# Patient Record
Sex: MALE | Race: OTHER | Employment: PART TIME | URBAN - METROPOLITAN AREA
[De-identification: names, ages, dates, MRNs, and addresses within clinical notes are randomized per-mention and may not be internally consistent; named-entity substitution may affect disease eponyms.]

---

## 2021-04-04 ENCOUNTER — HOSPITAL ENCOUNTER (EMERGENCY)
Age: 26
Discharge: HOME | End: 2021-04-04
Payer: COMMERCIAL

## 2021-04-04 VITALS
DIASTOLIC BLOOD PRESSURE: 79 MMHG | WEIGHT: 205 LBS | HEART RATE: 78 BPM | RESPIRATION RATE: 14 BRPM | TEMPERATURE: 97.6 F | BODY MASS INDEX: 31.07 KG/M2 | SYSTOLIC BLOOD PRESSURE: 110 MMHG | HEIGHT: 68 IN | OXYGEN SATURATION: 100 %

## 2021-04-04 DIAGNOSIS — J30.9 ALLERGIC SINUSITIS: Primary | ICD-10-CM

## 2021-04-04 PROCEDURE — 99283 EMERGENCY DEPT VISIT LOW MDM: CPT

## 2021-04-04 RX ORDER — FLUTICASONE PROPIONATE 50 MCG
1 SPRAY, SUSPENSION (ML) NASAL DAILY
Qty: 1 BOTTLE | Refills: 0 | Status: SHIPPED | OUTPATIENT
Start: 2021-04-04 | End: 2021-04-25 | Stop reason: ALTCHOICE

## 2021-04-04 RX ORDER — FEXOFENADINE HCL AND PSEUDOEPHEDRINE HCI 180; 240 MG/1; MG/1
1 TABLET, EXTENDED RELEASE ORAL DAILY
Qty: 30 TABLET | Refills: 1 | Status: SHIPPED | OUTPATIENT
Start: 2021-04-04 | End: 2021-04-25 | Stop reason: ALTCHOICE

## 2021-04-04 RX ORDER — OXYMETAZOLINE HYDROCHLORIDE 0.05 G/100ML
2 SPRAY NASAL 2 TIMES DAILY
Qty: 1 BOTTLE | Refills: 0 | Status: SHIPPED | OUTPATIENT
Start: 2021-04-04 | End: 2021-04-07

## 2021-04-04 NOTE — ED PROVIDER NOTES
[facility]     Department of Emergency Medicine   ED  Provider Note  Admit Date/RoomTime: 4/4/2021 11:50 AM  ED Room: 30-A/30-A    Chief Complaint:   Nasal Congestion (for the past month. pt having nasal congestion. no cough or SOB. )    History of Present Illness      Asaf Parker is a 30 y.o. old male who presents to the ED for nasal congestion and sneezing that has been ongoing for the past month. Patient states his nose gets congested mostly at nighttime and he has to breathe through his mouth which is causing him to have dry mouth. He denies any headache, dizziness, fever/chills, rash, sore throat, ear pain, chest pain, shortness of breath, pain with breathing, abdominal pain, nausea, vomiting, or recent travel. Patient is alert and oriented x3 and in no apparent distress at this exam.  He states he has tried over-the-counter antihistamine with no relief. Patient does not have a family doctor     ROS   Pertinent positives and negatives are stated within HPI, all other systems reviewed and are negative. Past Medical History:  has no past medical history on file. Past Surgical History:  has no past surgical history on file. Social History:      Family History: family history is not on file. The patients home medications have been reviewed. Allergies: Patient has no known allergies. Allergies have been reviewed with patient. Physical Exam   VS:  /79   Pulse 78   Temp 97.6 °F (36.4 °C) (Temporal)   Resp 14   Ht 5' 8\" (1.727 m)   Wt 205 lb (93 kg)   SpO2 100%   BMI 31.17 kg/m²      Oxygen Saturation Interpretation: Normal.    Constitutional:  Alert, development consistent with age. NAD  Eyes: EOMI, non-injected conjunctiva   Ears:  External Ears: Bilateral normal.              TM's & External Canals:  normal TM's and external ear canals both ears. Throat: no erythema or exudates noted. , uvula midline, clear PND, Airway patent     Neck/Lymphatic: Supple. There is no cervical node tenderness. Non-tender with no meningeal signs   Respiratory:  Clear to auscultation and breath sounds equal, good air flow. No respiratory distress  CV: Regular rate and rhythm  Abdomen: Soft, non-tender, non-distended  Integument:  No rashes or erythema present. Neurological:  Motor functions intact. Lab / Imaging Results   (All laboratory and radiology results have been personally reviewed by myself)  Labs:  No results found for this visit on 04/04/21. Imaging: All Radiology results interpreted by Radiologist unless otherwise noted. No orders to display     ED Course / Medical Decision Making   ED Medications:   Medications - No data to display    Consults:  None    Procedures:  none     Medical Decision Making:   Patient is well appearing, non toxic and appropriate for outpatient management. Plan is for symptom management and PCP follow up. Counseling: The emergency provider has spoken with the patient and/or caregiver and discussed todays results, in addition to providing specific details for the plan of care and counseling regarding the diagnosis and prognosis. Questions are answered at this time and they are agreeable with the plan. All results reviewed with pt and all questions answered. I discussed the differential, results and discharge plan with the patient and/or family/friend/caregiver if present. I emphasized the importance of follow-up with the physician I referred them to in the timeframe recommended. I explained reasons for the patient to return to the Emergency Department. Additional verbal discharge instructions were also given and discussed with the patient to supplement those generated by the EMR. We also discussed medications that were prescribed (if any) including common side effects and interactions. All questions were addressed. They understand return precautions and discharge instructions.  The patient and/or family/friend/caregiver expressed understanding. Vitals were stable and they were in no distress at discharge. Patient advised to only use Afrin for 3 days and then start Flonase afterwards if needed   Antibiotics are not indicated at this time based on clinical presentation and physical findings. Not hypoxic, nothing to suggest pneumonia. Patient is well appearing, non toxic and appropriate for outpatient management. Plan of Care: Normal progression of disease discussed. All questions answered. Explained the rationale for symptomatic treatment rather than use of an antibiotic. Instruction provided in the use of fluids, vaporizer, acetaminophen, and other OTC medication for symptom control. Extra fluids  Analgesics as needed, dose reviewed. Follow up as needed should symptoms fail to improve. Assessment     1. Allergic sinusitis      Plan   Discharge to home  Patient condition is good    New Medications     New Prescriptions    FEXOFENADINE-PSEUDOEPHEDRINE (ALLEGRA-D 24HR) 180-240 MG PER EXTENDED RELEASE TABLET    Take 1 tablet by mouth daily    FLUTICASONE (FLONASE) 50 MCG/ACT NASAL SPRAY    1 spray by Each Nostril route daily    OXYMETAZOLINE (12 HOUR NASAL SPRAY) 0.05 % NASAL SPRAY    2 sprays by Nasal route 2 times daily for 3 days       Electronically signed by Oumou Askew PA-C   DD: 4/4/21  **This report was transcribed using voice recognition software. Every effort was made to ensure accuracy; however, inadvertent computerized transcription errors may be present.     END OF ED PROVIDER NOTE          Oumou Askew PA-C  04/04/21 7640

## 2021-04-04 NOTE — ED NOTES
Bed: 30-A  Expected date:   Expected time:   Means of arrival:   Comments:  JUDIE Bolanos.  Francia Libman, KATHLEEN  04/04/21 1208

## 2021-04-25 ENCOUNTER — APPOINTMENT (OUTPATIENT)
Dept: GENERAL RADIOLOGY | Age: 26
End: 2021-04-25
Payer: COMMERCIAL

## 2021-04-25 ENCOUNTER — HOSPITAL ENCOUNTER (EMERGENCY)
Age: 26
Discharge: HOME | End: 2021-04-25
Payer: COMMERCIAL

## 2021-04-25 VITALS
BODY MASS INDEX: 31.07 KG/M2 | OXYGEN SATURATION: 100 % | HEART RATE: 100 BPM | DIASTOLIC BLOOD PRESSURE: 89 MMHG | WEIGHT: 205 LBS | HEIGHT: 68 IN | TEMPERATURE: 98.5 F | SYSTOLIC BLOOD PRESSURE: 128 MMHG | RESPIRATION RATE: 14 BRPM

## 2021-04-25 DIAGNOSIS — Z71.89 EDUCATED ABOUT 2019 NOVEL CORONAVIRUS INFECTION: ICD-10-CM

## 2021-04-25 DIAGNOSIS — R52 GENERALIZED BODY ACHES: ICD-10-CM

## 2021-04-25 DIAGNOSIS — J06.9 VIRAL URI WITH COUGH: Primary | ICD-10-CM

## 2021-04-25 LAB — STREP GRP A PCR: NEGATIVE

## 2021-04-25 PROCEDURE — 87880 STREP A ASSAY W/OPTIC: CPT

## 2021-04-25 PROCEDURE — 71046 X-RAY EXAM CHEST 2 VIEWS: CPT

## 2021-04-25 PROCEDURE — 6370000000 HC RX 637 (ALT 250 FOR IP): Performed by: NURSE PRACTITIONER

## 2021-04-25 PROCEDURE — U0003 INFECTIOUS AGENT DETECTION BY NUCLEIC ACID (DNA OR RNA); SEVERE ACUTE RESPIRATORY SYNDROME CORONAVIRUS 2 (SARS-COV-2) (CORONAVIRUS DISEASE [COVID-19]), AMPLIFIED PROBE TECHNIQUE, MAKING USE OF HIGH THROUGHPUT TECHNOLOGIES AS DESCRIBED BY CMS-2020-01-R: HCPCS

## 2021-04-25 PROCEDURE — 99284 EMERGENCY DEPT VISIT MOD MDM: CPT

## 2021-04-25 RX ORDER — BENZONATATE 100 MG/1
100 CAPSULE ORAL 3 TIMES DAILY PRN
Qty: 21 CAPSULE | Refills: 0 | Status: SHIPPED | OUTPATIENT
Start: 2021-04-25 | End: 2021-05-02

## 2021-04-25 RX ORDER — ACETAMINOPHEN 500 MG
1000 TABLET ORAL ONCE
Status: COMPLETED | OUTPATIENT
Start: 2021-04-25 | End: 2021-04-25

## 2021-04-25 RX ORDER — ACETAMINOPHEN 500 MG
500 TABLET ORAL EVERY 6 HOURS PRN
Qty: 20 TABLET | Refills: 0 | Status: SHIPPED | OUTPATIENT
Start: 2021-04-25 | End: 2021-07-07 | Stop reason: ALTCHOICE

## 2021-04-25 RX ADMIN — ACETAMINOPHEN 1000 MG: 500 TABLET ORAL at 12:59

## 2021-04-25 ASSESSMENT — PAIN SCALES - GENERAL: PAINLEVEL_OUTOF10: 5

## 2021-04-25 NOTE — ED PROVIDER NOTES
[address] [place] [address]  Department of Emergency Medicine   ED  Encounter Note  Admit Date/RoomTime: 2021 12:11 PM  ED Room:       NAME: Dony Turcios  : 1995  MRN: [id number]     Chief Complaint:  Generalized Body Aches (x 2 days) and Cough    History of Present Illness      Dony Turcios is a 30 y.o. old male who presents to the emergency department by private vehicle with spouse with complaints of bilateral sore throat and non productive cough for the past 2 days. He complains of gradual onset of bilateral sore throat pain, which occured 2 day(s) prior to arrival. Associated Signs & Symptoms: generalized body aches. Since onset the symptoms have been persistent and gradually worsening. He is drinking plenty of fluids. He denies any chest pain, shortness of breath, palpitations, dizziness, nausea, vomiting, diarrhea, headache, neck stiffness, rashes, hemoptysis, leg pain, leg swelling or recent travel. Patient works at [employer] and will need an excuse. He denies any vaccinations or history of COVID-19. He denies any excessive fatigue. He denies any tobacco use. Denies any wheezing or history of asthma. He denies any loss of taste or sense of smell. Patient spouse is also here being treated for similar symptoms. Exposed To: Streptococcus: unknown. Infectious Mononucleosis:  unknown. Symptoms:  Pain:  Yes. Muffled Voice:  No.                            Hoarse:  No.                            Difficulty with Secretions:  No.    Centor Score (MODIFIED) For Strep Pharyngitis:    Age 3-14 Years   no (0)     Age >44 Years   NO     Exudate or Swelling on Tonsils   yes (1)     Tender/Swollen Anterior Cervical Nodes   yes (1)     Fever? (T > 38°C, 100.4°F)   no (0)     Absence of Cough   no (0)   TOTAL POINTS   2   Score of Zero = Probability of Strep Pharyngitis: 1% - 2.5%. ,   No further testing nor antibiotics. Score of 1 = Probability of Strep Pharyngitis: 5% - 10%. ,   No further testing nor antibiotics. Score of 2 = Probability of Strep Phar: 11% - 17%. Culture/test all, ATB's only for positive culture results. Score of 3 = Probability of Strep Phar: 28% - 35%. Culture/test all, ATB's only for positive culture results  Score of 4 or 5 = Probability of Strep Pharyngitis: 51% - 53%. Treat empirically with antibiotics. ROS   Pertinent positives and negatives are stated within HPI, all other systems reviewed and are negative. Past Medical History:  has no past medical history on file. Surgical History:  has no past surgical history on file. Social History:  reports that he has never smoked. He has never used smokeless tobacco. He reports previous alcohol use. He reports that he does not use drugs. Family History: family history is not on file. Allergies: Patient has no known allergies. Physical Exam   Oxygen Saturation Interpretation: Normal.        ED Triage Vitals [04/25/21 1208]   BP Temp Temp Source Pulse Resp SpO2 Height Weight   128/89 98.5 °F (36.9 °C) Temporal 100 14 100 % 5' 8\" (1.727 m) 205 lb (93 kg)         Constitutional:  Alert, development consistent with age. .  Ears:  TMs without perforation, injection, or bulging. External canals clear without exudate. Throat: Airway Patent. no exudates noted. Teeth and gums normal.  Moderate posterior pharynx erythema with no tonsillar enlargement. Neck/Lymphatic:  Supple. There is no  preauricular, submental, parotid, posterior cervical, supraclavicular, epitrochlear and axillary node tenderness. Bilateral anterior cervical node tenderness with no enlargement. respiratory:  Clear to auscultation and breath sounds equal.    CV: Regular rate and rhythm, normal heart sounds, without pathological murmurs, ectopy, gallops, or rubs. GI:  Abdomen Soft, nontender, good bowel sounds.   No firm or pulsatile mass.  Inegument:  No rashes, erythema present. Neurological:  Oriented. Motor functions intact. Lab / Imaging Results   (All laboratory and radiology results have been personally reviewed by myself)  Labs:  Results for orders placed or performed during the hospital encounter of 04/25/21   Strep Screen Group A Throat    Specimen: Throat   Result Value Ref Range    Strep Grp A PCR Negative Negative     Imaging: All Radiology results interpreted by Radiologist unless otherwise noted. XR CHEST (2 VW)   Final Result   No acute cardiopulmonary process. ED Course / Medical Decision Making     Medications   acetaminophen (TYLENOL) tablet 1,000 mg (1,000 mg Oral Given 4/25/21 8329)        Consult(s):   None    Procedure(s):   none    MDM:   Based on moderate suspicion for Strep as per history/physical findings, Rapid Strep/Throat Culture testing was done  Pharyngitis is unlikely to  be Strep. Antibiotics are not indicated at this time based on clinical presentation and physical findings. Not hypoxic, nothing to suggest pneumonia. Patient is well appearing, non toxic and appropriate for outpatient management. Patient advised on COVID-19 isolation for the next 8 days. Since he complains of symptoms for the past 2 days. Patient will given a inhaler Tessalon Perles and Tylenol for symptomatic relief. Advised on signs and symptoms warranting immediate return to the ED for reevaluation. Patient is afebrile, nontoxic in appearance, hemodynamically stable has oxygen saturation 100% on room air in no acute respiratory distress or complaints of chest pain or shortness of breath. Chest x-ray is negative for any acute findings. Patient advised on isolation for 10 days after symptoms onset. Work excuse provided. Advised on signs and symptoms warranting immediate return to the ED for reevaluation at any time. Plan of Care: Normal progression of disease discussed. All questions answered.   Explained the rationale for symptomatic treatment rather than use of an antibiotic. Instruction provided in the use of fluids, vaporizer, acetaminophen, and other OTC medication for symptom control. Extra fluids  Analgesics as needed, dose reviewed. Follow up as needed should symptoms fail to improve. Follow-up in 3 days, or sooner should symptoms worsen. Counseling:  I reviewed today's visit with the patient in addition to providing specific details for the plan of care and counseling regarding the diagnosis and prognosis. Questions are answered at this time and are agreeable with the plan. Assessment     1. Viral URI with cough    2. Generalized body aches    3. Educated about 2019 novel coronavirus infection      Plan   Discharge home. Patient condition is good    New Medications     Discharge Medication List as of 4/25/2021  1:53 PM      START taking these medications    Details   benzonatate (TESSALON PERLES) 100 MG capsule Take 1 capsule by mouth 3 times daily as needed for Cough, Disp-21 capsule, R-0Print      acetaminophen (APAP EXTRA STRENGTH) 500 MG tablet Take 1 tablet by mouth every 6 hours as needed for Pain, Disp-20 tablet, R-0Print      Magic Mouthwash (MIRACLE MOUTHWASH) Swish and spit 5 mLs 4 times daily as needed for Irritation Preparation: 10cc maalox,  10cc Benadryl, 10cc Viscous Lidocaine, Disp-240 mL, R-0Print           Electronically signed by JENNA Greenberg CNP   DD: 4/25/21  **This report was transcribed using voice recognition software. Every effort was made to ensure accuracy; however, inadvertent computerized transcription errors may be present.   END OF ED PROVIDER NOTE     JENNA Greenberg CNP  04/25/21 5481

## 2021-04-26 ENCOUNTER — CARE COORDINATION (OUTPATIENT)
Dept: CARE COORDINATION | Age: 26
End: 2021-04-26

## 2021-04-26 LAB — SARS-COV-2, PCR: DETECTED

## 2021-04-26 NOTE — CARE COORDINATION
Patient contacted regarding [facility]AT-29 diagnosis\". Discussed COVID-19 related testing which was available at this time. Test results were positive. Patient informed of results, if available? Yes    LPN Care Coordinator contacted the patient by telephone to perform post discharge assessment. Call within 2 business days of discharge: Yes. Verified name and  with patient as identifiers. Provided introduction to self, and explanation of the CTN/ACM role, and reason for call due to risk factors for infection and/or exposure to COVID-19. Symptoms reviewed with patient who verbalized the following symptoms: fatigue, pain or aching joints and cough. Due to no new or worsening symptoms encounter was not routed to provider for escalation. Discussed follow-up appointments. If no appointment was previously scheduled, appointment scheduling offered: Yes  [facility] follow up appointment(s): No future appointments. Non-[facility] follow up appointment(s): NA    Patient states he is feeling better. Patient was given phone number to Physician Referral line to establish with a new PCP. List of Walk in clinics was emailed to patient as well. Patient states they have filled all prescriptions and are taking them as written. Advised hydration with gatorade or pedialyte. Tylenol for fever, aches. Patient also advised when to return to ER, significant worsening of symptoms, especially shortness of breath or elevated temperatures. Patient voiced understanding. Will be followed by [facility]. Non-face-to-face services provided:  Obtained and reviewed discharge summary and/or continuity of care documents  Education of patient/family/caregiver/guardian to support self-management-symptom management. Advance Care Planning:   Does patient have an Advance Directive:  decision maker updated. Patient has following risk factors of: no known risk factors.  LPN CC reviewed discharge instructions, medical action plan and red flags such as increased shortness of breath, increasing fever and signs of decompensation with patient who verbalized understanding. Discussed exposure protocols and quarantine with CDC Guidelines What to do if you are sick with coronavirus disease 2019.  Patient was given an opportunity for questions and concerns. The patient agrees to contact the [facility] exposure line [phone], [facility]: ([phone]) and PCP office for questions related to their healthcare. LPN CC provided contact information for future needs. Reviewed and educated patient on any new and changed medications related to discharge diagnosis     Was patient discharged with a pulse oximeter? No.     Patient/family/caregiver given information for GetWell Loop and agrees to enroll yes  Patient's preferred e-mail:  [email or web address]  Patient's preferred phone number: [phone]  Based on Loop alert triggers, patient will be contacted by nurse care manager for worsening symptoms. Pt will be further monitored by COVID Loop Team based on severity of symptoms and risk factors.

## 2021-07-07 ENCOUNTER — OFFICE VISIT (OUTPATIENT)
Dept: FAMILY MEDICINE CLINIC | Age: 26
End: 2021-07-07
Payer: COMMERCIAL

## 2021-07-07 VITALS
TEMPERATURE: 97.3 F | WEIGHT: 204 LBS | DIASTOLIC BLOOD PRESSURE: 82 MMHG | HEART RATE: 87 BPM | BODY MASS INDEX: 30.92 KG/M2 | HEIGHT: 68 IN | SYSTOLIC BLOOD PRESSURE: 120 MMHG | OXYGEN SATURATION: 98 % | RESPIRATION RATE: 18 BRPM

## 2021-07-07 DIAGNOSIS — Z00.00 GENERAL MEDICAL EXAM: ICD-10-CM

## 2021-07-07 DIAGNOSIS — Z00.00 GENERAL MEDICAL EXAM: Primary | ICD-10-CM

## 2021-07-07 LAB
ALBUMIN SERPL-MCNC: 4.2 G/DL (ref 3.5–5.2)
ALP BLD-CCNC: 74 U/L (ref 40–129)
ALT SERPL-CCNC: 17 U/L (ref 0–40)
ANION GAP SERPL CALCULATED.3IONS-SCNC: 15 MMOL/L (ref 7–16)
AST SERPL-CCNC: 23 U/L (ref 0–39)
BASOPHILS ABSOLUTE: 0.05 E9/L (ref 0–0.2)
BASOPHILS RELATIVE PERCENT: 0.7 % (ref 0–2)
BILIRUB SERPL-MCNC: 1 MG/DL (ref 0–1.2)
BUN BLDV-MCNC: 12 MG/DL (ref 6–20)
CALCIUM SERPL-MCNC: 9.3 MG/DL (ref 8.6–10.2)
CHLORIDE BLD-SCNC: 103 MMOL/L (ref 98–107)
CHOLESTEROL, TOTAL: 187 MG/DL (ref 0–199)
CO2: 23 MMOL/L (ref 22–29)
CREAT SERPL-MCNC: 0.9 MG/DL (ref 0.7–1.2)
EOSINOPHILS ABSOLUTE: 0.2 E9/L (ref 0.05–0.5)
EOSINOPHILS RELATIVE PERCENT: 3 % (ref 0–6)
GFR AFRICAN AMERICAN: >60
GFR NON-AFRICAN AMERICAN: >60 ML/MIN/1.73
GLUCOSE BLD-MCNC: 90 MG/DL (ref 74–99)
HCT VFR BLD CALC: 43.9 % (ref 37–54)
HDLC SERPL-MCNC: 35 MG/DL
HEMOGLOBIN: 13.7 G/DL (ref 12.5–16.5)
IMMATURE GRANULOCYTES #: 0.02 E9/L
IMMATURE GRANULOCYTES %: 0.3 % (ref 0–5)
LDL CHOLESTEROL CALCULATED: 123 MG/DL (ref 0–99)
LIPASE: 31 U/L (ref 13–60)
LYMPHOCYTES ABSOLUTE: 2.71 E9/L (ref 1.5–4)
LYMPHOCYTES RELATIVE PERCENT: 40.3 % (ref 20–42)
MCH RBC QN AUTO: 26.2 PG (ref 26–35)
MCHC RBC AUTO-ENTMCNC: 31.2 % (ref 32–34.5)
MCV RBC AUTO: 84.1 FL (ref 80–99.9)
MONOCYTES ABSOLUTE: 0.66 E9/L (ref 0.1–0.95)
MONOCYTES RELATIVE PERCENT: 9.8 % (ref 2–12)
NEUTROPHILS ABSOLUTE: 3.09 E9/L (ref 1.8–7.3)
NEUTROPHILS RELATIVE PERCENT: 45.9 % (ref 43–80)
PDW BLD-RTO: 14 FL (ref 11.5–15)
PLATELET # BLD: 205 E9/L (ref 130–450)
PMV BLD AUTO: 12.8 FL (ref 7–12)
POTASSIUM SERPL-SCNC: 4.7 MMOL/L (ref 3.5–5)
RBC # BLD: 5.22 E12/L (ref 3.8–5.8)
SODIUM BLD-SCNC: 141 MMOL/L (ref 132–146)
TOTAL PROTEIN: 6.8 G/DL (ref 6.4–8.3)
TRIGL SERPL-MCNC: 145 MG/DL (ref 0–149)
TSH SERPL DL<=0.05 MIU/L-ACNC: 1.28 UIU/ML (ref 0.27–4.2)
VLDLC SERPL CALC-MCNC: 29 MG/DL
WBC # BLD: 6.7 E9/L (ref 4.5–11.5)

## 2021-07-07 PROCEDURE — 99203 OFFICE O/P NEW LOW 30 MIN: CPT | Performed by: PHYSICIAN ASSISTANT

## 2021-07-07 NOTE — PROGRESS NOTES
21  Bree Parra : 1995 Sex: male  Age 30 y.o. Subjective:  Chief Complaint   Patient presents with    Annual Exam     no complaints          HPI:   Bree Parra , 30 y.o. male presents to [facility] for evaluation of general medical exam.  The patient states that he has no complaints he just has not been missing her by evaluated by anyone and was wondering if he could get some blood work done. The patient does not have a PCP. The patient is not a smoker, no illicit drug use, no alcohol use. He has not had any previous surgeries. He is never been prescribed any medications. ROS:   Unless otherwise stated in this report the patient's positive and negative responses for review of systems for constitutional, eyes, ENT, cardiovascular, respiratory, gastrointestinal, neurological, , musculoskeletal, and integument systems and related systems to the presenting problem are either stated in the history of present illness or were not pertinent or were negative for the symptoms and/or complaints related to the presenting medical problem. Positives and pertinent negatives as per HPI. All others reviewed and are negative. PMH:   History reviewed. No pertinent past medical history. History reviewed. No pertinent surgical history. History reviewed. No pertinent family history. Medications:   No current outpatient medications on file. Allergies:   No Known Allergies    Social History:     Social History     Tobacco Use    Smoking status: Never Smoker    Smokeless tobacco: Never Used   Substance Use Topics    Alcohol use: Not Currently    Drug use: Never       Patient lives at home. Physical Exam:     Vitals:    21 1218   BP: 120/82   Pulse: 87   Resp: 18   Temp: 97.3 °F (36.3 °C)   SpO2: 98%   Weight: 204 lb (92.5 kg)   Height: 5' 8\" (1.727 m)       Exam:  Physical Exam  Nurses note and vital signs reviewed and patient is not hypoxic.     General: The patient appears well and in no apparent distress. Patient is resting comfortably on cart. Skin: Warm, dry, no pallor noted. There is no rash noted. Head: Normocephalic, atraumatic. Eye: Normal conjunctiva  Ears, Nose, Mouth, and Throat: Oral mucosa is moist  Cardiovascular: Regular Rate and Rhythm  Respiratory: Patient is in no distress, no accessory muscle use, lungs are clear to auscultation, no wheezing, crackles or rhonchi  Back: Non-tender, no CVA tenderness bilaterally to percussion. GI: Normal bowel sounds, no tenderness to palpation, no masses appreciated. No rebound, guarding, or rigidity noted.   Musculoskeletal: The patient has no evidence of calf tenderness, no pitting edema, symmetrical pulses noted bilaterally  Neurological: A&O x4, normal speech        Testing:     Component      Latest Ref Rng & Units 7/7/2021 7/7/2021 7/7/2021 7/7/2021           1:00 PM  1:00 PM  1:00 PM  1:00 PM   WBC      4.5 - 11.5 E9/L       RBC      3.80 - 5.80 E12/L       Hemoglobin Quant      12.5 - 16.5 g/dL       Hematocrit      37.0 - 54.0 %       MCV      80.0 - 99.9 fL       MCH      26.0 - 35.0 pg       MCHC      32.0 - 34.5 %       RDW      11.5 - 15.0 fL       Platelet Count      334 - 450 E9/L       MPV      7.0 - 12.0 fL       Neutrophils %      43.0 - 80.0 %       Immature Granulocytes %      0.0 - 5.0 %       Lymphocyte %      20.0 - 42.0 %       Monocytes %      2.0 - 12.0 %       Eosinophils %      0.0 - 6.0 %       Basophils %      0.0 - 2.0 %       Neutrophils Absolute      1.80 - 7.30 E9/L       Immature Granulocytes #      E9/L       Lymphocytes Absolute      1.50 - 4.00 E9/L       Monocytes Absolute      0.10 - 0.95 E9/L       Eosinophils Absolute      0.05 - 0.50 E9/L       Basophils Absolute      0.00 - 0.20 E9/L       Sodium      132 - 146 mmol/L 141      Potassium      3.5 - 5.0 mmol/L 4.7      Chloride      98 - 107 mmol/L 103      CO2      22 - 29 mmol/L 23      Anion Gap      7 - 16 mmol/L 15 Glucose      74 - 99 mg/dL 90      BUN      6 - 20 mg/dL 12      Creatinine      0.7 - 1.2 mg/dL 0.9      GFR Non-African American      >=60 mL/min/1.73 >60      GFR        >60      Calcium      8.6 - 10.2 mg/dL 9.3      Total Protein      6.4 - 8.3 g/dL 6.8      Albumin      3.5 - 5.2 g/dL 4.2      Bilirubin      0.0 - 1.2 mg/dL 1.0      Alk Phos      40 - 129 U/L 74      ALT      0 - 40 U/L 17      AST      0 - 39 U/L 23      Cholesterol, Total      0 - 199 mg/dL   187    Triglycerides      0 - 149 mg/dL   145    HDL Cholesterol      >40 mg/dL   35    LDL Calculated      0 - 99 mg/dL   123 (H)    VLDL Cholesterol Calculated      mg/dL   29    TSH      0.270 - 4.200 uIU/mL    1.280   Lipase      13 - 60 U/L  31       Component      Latest Ref Rng & Units 7/7/2021           1:00 PM   WBC      4.5 - 11.5 E9/L 6.7   RBC      3.80 - 5.80 E12/L 5.22   Hemoglobin Quant      12.5 - 16.5 g/dL 13.7   Hematocrit      37.0 - 54.0 % 43.9   MCV      80.0 - 99.9 fL 84.1   MCH      26.0 - 35.0 pg 26.2   MCHC      32.0 - 34.5 % 31.2 (L)   RDW      11.5 - 15.0 fL 14.0   Platelet Count      430 - 450 E9/L 205   MPV      7.0 - 12.0 fL 12.8 (H)   Neutrophils %      43.0 - 80.0 % 45.9   Immature Granulocytes %      0.0 - 5.0 % 0.3   Lymphocyte %      20.0 - 42.0 % 40.3   Monocytes %      2.0 - 12.0 % 9.8   Eosinophils %      0.0 - 6.0 % 3.0   Basophils %      0.0 - 2.0 % 0.7   Neutrophils Absolute      1.80 - 7.30 E9/L 3.09   Immature Granulocytes #      E9/L 0.02   Lymphocytes Absolute      1.50 - 4.00 E9/L 2.71   Monocytes Absolute      0.10 - 0.95 E9/L 0.66   Eosinophils Absolute      0.05 - 0.50 E9/L 0.20   Basophils Absolute      0.00 - 0.20 E9/L 0.05   Sodium      132 - 146 mmol/L    Potassium      3.5 - 5.0 mmol/L    Chloride      98 - 107 mmol/L    CO2      22 - 29 mmol/L    Anion Gap      7 - 16 mmol/L    Glucose      74 - 99 mg/dL    BUN      6 - 20 mg/dL    Creatinine      0.7 - 1.2 mg/dL    GFR Non-African American      >=60 mL/min/1.73    GFR African American          Calcium      8.6 - 10.2 mg/dL    Total Protein      6.4 - 8.3 g/dL    Albumin      3.5 - 5.2 g/dL    Bilirubin      0.0 - 1.2 mg/dL    Alk Phos      40 - 129 U/L    ALT      0 - 40 U/L    AST      0 - 39 U/L    Cholesterol, Total      0 - 199 mg/dL    Triglycerides      0 - 149 mg/dL    HDL Cholesterol      >40 mg/dL    LDL Calculated      0 - 99 mg/dL    VLDL Cholesterol Calculated      mg/dL    TSH      0.270 - 4.200 uIU/mL    Lipase      13 - 60 U/L          Medical Decision Making:     Vital signs reviewed    Past medical history reviewed. Allergies reviewed. Medications reviewed. Patient on arrival does not appear to be in any apparent distress or discomfort. The patient has been seen and evaluated. The patient does not appear to be toxic or lethargic. The patient had laboratory studies obtained and showed a normal white blood cell count at 6.7. Hemoglobin hematocrit are stable. Platelet count is 488. No significant left shift. The patient had a comprehensive metabolic panel that was within normal limits. The patient's lipid panel did show a slight elevation of LDL at 123 but total cholesterol was 187. The patient had a HDL of 35. The patient's TSH was normal at 1.280. Lipase was normal at 31. We will update the patient with the plan of care and we did establish him with a PCP on 7/16/2021. The patient is to return to express care or go directly to the emergency department should any of the signs or symptoms worsen. The patient is to followup with primary care physician in 2-3 days for repeat evaluation. The patient has no other questions or concerns at this time the patient will be discharged home. Clinical Impression:   Jazzmine Haile was seen today for annual exam.    Diagnoses and all orders for this visit:    General medical exam  -     CBC Auto Differential; Future  -     COMPREHENSIVE METABOLIC PANEL;  Future  - LIPASE; Future  -     LIPID PANEL; Future  -     TSH; Future        The patient is to call for any concerns or return if any of the signs or symptoms worsen. The patient is to follow-up with PCP in the next 2-3 days for repeat evaluation repeat assessment or go directly to the emergency department.      SIGNATURE: Natasha Cerna III, PA-C

## 2021-07-16 ENCOUNTER — OFFICE VISIT (OUTPATIENT)
Dept: PRIMARY CARE CLINIC | Age: 26
End: 2021-07-16
Payer: COMMERCIAL

## 2021-07-16 VITALS
TEMPERATURE: 97.9 F | HEART RATE: 75 BPM | SYSTOLIC BLOOD PRESSURE: 128 MMHG | BODY MASS INDEX: 30.92 KG/M2 | WEIGHT: 204 LBS | DIASTOLIC BLOOD PRESSURE: 68 MMHG | OXYGEN SATURATION: 98 % | HEIGHT: 68 IN

## 2021-07-16 DIAGNOSIS — G47.19 EXCESSIVE DAYTIME SLEEPINESS: ICD-10-CM

## 2021-07-16 DIAGNOSIS — R53.82 CHRONIC FATIGUE: ICD-10-CM

## 2021-07-16 DIAGNOSIS — R00.2 PALPITATIONS: ICD-10-CM

## 2021-07-16 DIAGNOSIS — Z00.00 ADULT GENERAL MEDICAL EXAM: Primary | ICD-10-CM

## 2021-07-16 PROCEDURE — 93010 ELECTROCARDIOGRAM REPORT: CPT | Performed by: FAMILY MEDICINE

## 2021-07-16 PROCEDURE — 93000 ELECTROCARDIOGRAM COMPLETE: CPT | Performed by: FAMILY MEDICINE

## 2021-07-16 PROCEDURE — 99213 OFFICE O/P EST LOW 20 MIN: CPT | Performed by: FAMILY MEDICINE

## 2021-07-16 PROCEDURE — 96372 THER/PROPH/DIAG INJ SC/IM: CPT | Performed by: FAMILY MEDICINE

## 2021-07-16 RX ORDER — CYANOCOBALAMIN 1000 UG/ML
1000 INJECTION INTRAMUSCULAR; SUBCUTANEOUS ONCE
Status: COMPLETED | OUTPATIENT
Start: 2021-07-16 | End: 2021-07-16

## 2021-07-16 RX ADMIN — CYANOCOBALAMIN 1000 MCG: 1000 INJECTION INTRAMUSCULAR; SUBCUTANEOUS at 17:11

## 2021-07-16 ASSESSMENT — PATIENT HEALTH QUESTIONNAIRE - PHQ9
SUM OF ALL RESPONSES TO PHQ QUESTIONS 1-9: 0
SUM OF ALL RESPONSES TO PHQ QUESTIONS 1-9: 0
1. LITTLE INTEREST OR PLEASURE IN DOING THINGS: 0
2. FEELING DOWN, DEPRESSED OR HOPELESS: 0
SUM OF ALL RESPONSES TO PHQ9 QUESTIONS 1 & 2: 0
SUM OF ALL RESPONSES TO PHQ QUESTIONS 1-9: 0

## 2021-07-16 NOTE — PROGRESS NOTES
bruise/bleed easily. Psychiatric/Behavioral: Positive for sleep disturbance. Negative for hallucinations and suicidal ideas. All other systems reviewed and are negative. Objective   Physical Exam  Vitals reviewed. HENT:      Head: Normocephalic and atraumatic. Eyes:      General: No scleral icterus. Conjunctiva/sclera: Conjunctivae normal.      Pupils: Pupils are equal, round, and reactive to light. Neck:      Thyroid: No thyromegaly. Cardiovascular:      Rate and Rhythm: Normal rate and regular rhythm. Heart sounds: Normal heart sounds. No murmur heard. Pulmonary:      Effort: Pulmonary effort is normal.      Breath sounds: Normal breath sounds. No rales. Abdominal:      General: Bowel sounds are normal. There is no distension. Palpations: Abdomen is soft. Tenderness: There is no abdominal tenderness. Musculoskeletal:         General: Normal range of motion. Cervical back: Neck supple. Lymphadenopathy:      Cervical: No cervical adenopathy. Skin:     General: Skin is warm and dry. Findings: No erythema or rash. Neurological:      Mental Status: He is alert and oriented to person, place, and time. Cranial Nerves: No cranial nerve deficit. Psychiatric:         Judgment: Judgment normal.              EKG Interpretation    Rhythm: normal sinus   Rate: normal  Axis: normal  Ectopy: none  Conduction: normal  ST Segments: nonspecific changes  T Waves: non specific changes and flattening in  III and aVf  Q Waves: none    Clinical Impression: no acute changes and non-specific EKG    Ronal Ragsdale DO      An electronic signature was used to authenticate this note.     --Maxx Ragsdale DO

## 2021-07-19 PROBLEM — F43.29 ADJUSTMENT DISORDER WITH MIXED EMOTIONAL FEATURES: Status: ACTIVE | Noted: 2021-07-19

## 2021-07-19 ASSESSMENT — ENCOUNTER SYMPTOMS
COUGH: 0
PHOTOPHOBIA: 0
BLOOD IN STOOL: 0
CONSTIPATION: 0
SHORTNESS OF BREATH: 0
DIARRHEA: 0
SORE THROAT: 0
BACK PAIN: 0
ABDOMINAL PAIN: 0
VOMITING: 0
NAUSEA: 0

## 2021-08-10 ENCOUNTER — OFFICE VISIT (OUTPATIENT)
Dept: FAMILY MEDICINE CLINIC | Age: 26
End: 2021-08-10

## 2021-08-10 VITALS
OXYGEN SATURATION: 98 % | DIASTOLIC BLOOD PRESSURE: 78 MMHG | HEIGHT: 68 IN | TEMPERATURE: 98 F | WEIGHT: 204 LBS | BODY MASS INDEX: 30.92 KG/M2 | HEART RATE: 99 BPM | SYSTOLIC BLOOD PRESSURE: 140 MMHG

## 2021-08-10 DIAGNOSIS — T63.481A INSECT STINGS, ACCIDENTAL OR UNINTENTIONAL, INITIAL ENCOUNTER: Primary | ICD-10-CM

## 2021-08-10 PROCEDURE — 96372 THER/PROPH/DIAG INJ SC/IM: CPT | Performed by: PHYSICIAN ASSISTANT

## 2021-08-10 PROCEDURE — 99214 OFFICE O/P EST MOD 30 MIN: CPT | Performed by: PHYSICIAN ASSISTANT

## 2021-08-10 RX ORDER — METHYLPREDNISOLONE ACETATE 80 MG/ML
60 INJECTION, SUSPENSION INTRA-ARTICULAR; INTRALESIONAL; INTRAMUSCULAR; SOFT TISSUE ONCE
Status: COMPLETED | OUTPATIENT
Start: 2021-08-10 | End: 2021-08-10

## 2021-08-10 RX ORDER — PREDNISONE 20 MG/1
TABLET ORAL
Qty: 18 TABLET | Refills: 0 | Status: SHIPPED
Start: 2021-08-10 | End: 2021-12-07 | Stop reason: ALTCHOICE

## 2021-08-10 RX ORDER — DIPHENHYDRAMINE HCL 25 MG
25 CAPSULE ORAL EVERY 6 HOURS PRN
Qty: 30 CAPSULE | Refills: 0 | Status: SHIPPED | OUTPATIENT
Start: 2021-08-10 | End: 2021-08-20

## 2021-08-10 RX ORDER — DIPHENHYDRAMINE HYDROCHLORIDE 50 MG/ML
25 INJECTION INTRAMUSCULAR; INTRAVENOUS ONCE
Status: COMPLETED | OUTPATIENT
Start: 2021-08-10 | End: 2021-08-10

## 2021-08-10 RX ADMIN — DIPHENHYDRAMINE HYDROCHLORIDE 25 MG: 50 INJECTION INTRAMUSCULAR; INTRAVENOUS at 15:58

## 2021-08-10 RX ADMIN — METHYLPREDNISOLONE ACETATE 60 MG: 80 INJECTION, SUSPENSION INTRA-ARTICULAR; INTRALESIONAL; INTRAMUSCULAR; SOFT TISSUE at 16:00

## 2021-08-10 NOTE — PROGRESS NOTES
25 MG capsule, Take 1 capsule by mouth every 6 hours as needed for Itching or Allergies, Disp: 30 capsule, Rfl: 0    Allergies:   No Known Allergies    Social History:     Social History     Tobacco Use    Smoking status: Never Smoker    Smokeless tobacco: Never Used   Substance Use Topics    Alcohol use: Not Currently    Drug use: Never       Patient lives at home. Physical Exam:     Vitals:    08/10/21 1538   BP: (!) 140/78   Pulse: 99   Temp: 98 °F (36.7 °C)   SpO2: 98%   Weight: 204 lb (92.5 kg)   Height: 5' 8\" (1.727 m)       Exam:  Physical Exam  Nurses note and vital signs reviewed and patient is not hypoxic. General: The patient appears well and in no apparent distress. Patient is resting comfortably on cart. Skin: Warm, dry, no pallor noted. There is no rash noted. Head: Normocephalic, atraumatic. Eye: Normal conjunctiva  Ears, Nose, Mouth, and Throat: Oral mucosa is moist without evidence of lip or tongue swelling, airway patent  Cardiovascular: Regular Rate and Rhythm  Respiratory: Patient is in no distress, no accessory muscle use, lungs are clear to auscultation, no wheezing, crackles or rhonchi  Musculoskeletal: The patient has 2 areas noted that appear to be evidence of the insect bite. The patient has a medial and lateral area. There is diffuse swelling noted around this area with some mild warmth and erythema. There is no lymphangitic streaking. The patient's pulses were intact the DP/PT 2+. The patient normal capillary refill. No cyanosis or mottling. Compartments are soft. Neurological: A&O x4, normal speech      Testing:           Medical Decision Making:     Vital signs reviewed    Past medical history reviewed. Allergies reviewed. Medications reviewed. Patient on arrival does not appear to be in any apparent distress or discomfort. The patient has been seen and evaluated. The patient does not appear to be toxic or lethargic.      The patient does not appear to be toxic or lethargic. The patient does appear well. Vital signs reviewed and noted to be within normal limits. The patient will be treated with Benadryl and methylprednisone here. The patient will be given Benadryl for home. The patient will also be given a tapering dose of prednisone. The patient is to use ice to the affected area and elevate. The patient is to monitor signs symptoms. If the signs or symptoms change or worsen go directly to the emergency department or return return immediately for evaluation    The patient is to return to express care or go directly to the emergency department should any of the signs or symptoms worsen. The patient is to followup with primary care physician in 2-3 days for repeat evaluation. The patient has no other questions or concerns at this time the patient will be discharged home. Clinical Impression:   Little Glenna was seen today for insect bite and foot swelling. Diagnoses and all orders for this visit:    Insect stings, accidental or unintentional, initial encounter    Other orders  -     diphenhydrAMINE (BENADRYL) injection 25 mg  -     predniSONE (DELTASONE) 20 MG tablet; 3 tablets once daily for 3 days, 2 tablets once daily for 3 days, one tablet once daily for 3 days  -     methylPREDNISolone acetate (DEPO-MEDROL) injection 60 mg  -     diphenhydrAMINE (BENADRYL ALLERGY) 25 MG capsule; Take 1 capsule by mouth every 6 hours as needed for Itching or Allergies        The patient is to call for any concerns or return if any of the signs or symptoms worsen. The patient is to follow-up with PCP in the next 2-3 days for repeat evaluation repeat assessment or go directly to the emergency department.      SIGNATURE: May Fuentes III, PA-C

## 2021-12-07 ENCOUNTER — OFFICE VISIT (OUTPATIENT)
Dept: FAMILY MEDICINE CLINIC | Age: 26
End: 2021-12-07
Payer: COMMERCIAL

## 2021-12-07 VITALS
BODY MASS INDEX: 31.52 KG/M2 | RESPIRATION RATE: 18 BRPM | OXYGEN SATURATION: 98 % | TEMPERATURE: 98.5 F | DIASTOLIC BLOOD PRESSURE: 78 MMHG | HEART RATE: 100 BPM | WEIGHT: 208 LBS | HEIGHT: 68 IN | SYSTOLIC BLOOD PRESSURE: 120 MMHG

## 2021-12-07 DIAGNOSIS — J01.90 ACUTE NON-RECURRENT SINUSITIS, UNSPECIFIED LOCATION: ICD-10-CM

## 2021-12-07 DIAGNOSIS — R50.9 FEVER, UNSPECIFIED FEVER CAUSE: ICD-10-CM

## 2021-12-07 DIAGNOSIS — R09.81 NASAL CONGESTION: ICD-10-CM

## 2021-12-07 DIAGNOSIS — J06.9 ACUTE UPPER RESPIRATORY INFECTION, UNSPECIFIED: Primary | ICD-10-CM

## 2021-12-07 LAB
INFLUENZA A ANTIGEN, POC: NORMAL
INFLUENZA B ANTIGEN, POC: NORMAL
Lab: NORMAL
QC PASS/FAIL: NORMAL
SARS-COV-2 RDRP RESP QL NAA+PROBE: NEGATIVE

## 2021-12-07 PROCEDURE — 87804 INFLUENZA ASSAY W/OPTIC: CPT | Performed by: PHYSICIAN ASSISTANT

## 2021-12-07 PROCEDURE — 87635 SARS-COV-2 COVID-19 AMP PRB: CPT | Performed by: PHYSICIAN ASSISTANT

## 2021-12-07 PROCEDURE — 99213 OFFICE O/P EST LOW 20 MIN: CPT | Performed by: PHYSICIAN ASSISTANT

## 2021-12-07 RX ORDER — BROMPHENIRAMINE MALEATE, PSEUDOEPHEDRINE HYDROCHLORIDE, AND DEXTROMETHORPHAN HYDROBROMIDE 2; 30; 10 MG/5ML; MG/5ML; MG/5ML
5 SYRUP ORAL 4 TIMES DAILY PRN
Qty: 120 ML | Refills: 0 | Status: SHIPPED
Start: 2021-12-07 | End: 2022-01-05

## 2021-12-07 RX ORDER — PREDNISONE 10 MG/1
TABLET ORAL
Qty: 18 TABLET | Refills: 0 | Status: SHIPPED
Start: 2021-12-07 | End: 2022-01-05

## 2021-12-07 RX ORDER — DOXYCYCLINE HYCLATE 100 MG
100 TABLET ORAL 2 TIMES DAILY
Qty: 20 TABLET | Refills: 0 | Status: SHIPPED | OUTPATIENT
Start: 2021-12-07 | End: 2021-12-17

## 2021-12-07 NOTE — LETTER
[facility]  [address] [place] [address]  Phone: [phone]  Fax: [phone] [facility], [address]        December 7, 2021     Patient: Gely Steve   YOB: 1995   Date of Visit: 12/7/2021       To Whom it May Concern:    Gely Steve was seen in my clinic on 12/7/2021. If you have any questions or concerns, please don't hesitate to call.     Sincerely,         ARIEL Guajardo III

## 2021-12-07 NOTE — PROGRESS NOTES
21  Juan A Waggoner : 1995 Sex: male  Age 27 y.o. Subjective:  Chief Complaint   Patient presents with    Cough    Pharyngitis    Fever    Chills         HPI:   Juan A Waggoner , 27 y.o. male presents to [facility] care for evaluation of cough, sore throat, fever, chills    HPI  22-year-old male presents to [facility] for evaluation of cough, congestion, drainage, sore throat. The patient said the symptoms over the last couple of days. The patient has noted some chills. The patient has noted fever. The patient states that it does not seem to be improving. Patient is not currently on any antibiotics. Did have Covid back in 2021. The patient has not had the vaccine. ROS:   Unless otherwise stated in this report the patient's positive and negative responses for review of systems for constitutional, eyes, ENT, cardiovascular, respiratory, gastrointestinal, neurological, , musculoskeletal, and integument systems and related systems to the presenting problem are either stated in the history of present illness or were not pertinent or were negative for the symptoms and/or complaints related to the presenting medical problem. Positives and pertinent negatives as per HPI. All others reviewed and are negative. PMH:   History reviewed. No pertinent past medical history. History reviewed. No pertinent surgical history. History reviewed. No pertinent family history.     Medications:     Current Outpatient Medications:     doxycycline hyclate (VIBRA-TABS) 100 MG tablet, Take 1 tablet by mouth 2 times daily for 10 days, Disp: 20 tablet, Rfl: 0    predniSONE (DELTASONE) 10 MG tablet, 3 tabs once daily for 3 days, 2 tabs once daily for 3 days, 1 tab once daily for 3 days, Disp: 18 tablet, Rfl: 0    brompheniramine-pseudoephedrine-DM 2-30-10 MG/5ML syrup, Take 5 mLs by mouth 4 times daily as needed for Congestion or Cough, Disp: 120 mL, Rfl: 0    Allergies:   No Known Allergies    Social History:     Social History     Tobacco Use    Smoking status: Never Smoker    Smokeless tobacco: Never Used   Substance Use Topics    Alcohol use: Not Currently    Drug use: Never       Patient lives at home. Physical Exam:     Vitals:    12/07/21 1020   BP: 120/78   Site: Right Upper Arm   Position: Sitting   Cuff Size: Medium Adult   Pulse: 100   Resp: 18   Temp: 98.5 °F (36.9 °C)   TempSrc: Temporal   SpO2: 98%   Weight: 208 lb (94.3 kg)   Height: 5' 8\" (1.727 m)       Exam:  Physical Exam  Nurse's notes and vital signs reviewed. The patient is not hypoxic. ? General: Alert, no acute distress, patient resting comfortably Patient is not toxic or lethargic. Skin: Warm, intact, no pallor noted. There is no evidence of rash at this time. Head: Normocephalic, atraumatic  Eye: Normal conjunctiva  Ears, Nose, Throat: Right tympanic membrane clear, left tympanic membrane clear. No drainage or discharge noted. No pre- or post-auricular tenderness, erythema, or swelling noted. Nasal congestion, rhinorrhea  Posterior oropharynx shows no erythema, tonsillar hypertrophy, or exudate. the uvula is midline. No trismus or drooling is noted. Moist mucous membranes. Neck: No anterior/posterior lymphadenopathy noted. No erythema, no masses, no fluctuance or induration noted. No meningeal signs. Cardiovascular: Regular Rate and Rhythm  Respiratory: No acute distress, harsh cough but no rhonchi, wheezing or crackles noted. No stridor or retractions are noted.   Neurological: A&O x4, normal speech  Psychiatric: Cooperative         Testing:     Results for orders placed or performed in visit on 12/07/21   POCT COVID-19, Rapid   Result Value Ref Range    SARS-COV-2, RdRp gene Negative Negative    Lot Number [id number]     QC Pass/Fail Pass    POCT Influenza A/B Antigen   Result Value Ref Range    Inflenza A Ag Neg     Influenza B Ag Neg            Medical Decision Making:     Vital signs reviewed    Past medical history reviewed. Allergies reviewed. Medications reviewed. Patient on arrival does not appear to be in any apparent distress or discomfort. The patient has been seen and evaluated. The patient does not appear to be toxic or lethargic. The patient's rapid Covid was the R DRP gene and that was negative. Influenza was negative. The patient will be treated with Bromfed, doxycycline, prednisone. The patient was educated on the proper dosage of motrin and tylenol and the appropriate intervals of each. The patient is to increase fluid intake over the next several days. The patient is to use OTC decongestant as needed. The patient is to return to express care or go directly to the emergency department should any of the signs or symptoms worsen. The patient is to followup with primary care physician in 2-3 days for repeat evaluation. The patient has no other questions or concerns at this time the patient will be discharged home. Clinical Impression:   Jose Soria was seen today for cough, pharyngitis, fever and chills. Diagnoses and all orders for this visit:    Acute upper respiratory infection, unspecified    Fever, unspecified fever cause  -     POCT COVID-19, Rapid  -     POCT Influenza A/B Antigen    Acute non-recurrent sinusitis, unspecified location    Nasal congestion    Other orders  -     doxycycline hyclate (VIBRA-TABS) 100 MG tablet; Take 1 tablet by mouth 2 times daily for 10 days  -     predniSONE (DELTASONE) 10 MG tablet; 3 tabs once daily for 3 days, 2 tabs once daily for 3 days, 1 tab once daily for 3 days  -     brompheniramine-pseudoephedrine-DM 2-30-10 MG/5ML syrup; Take 5 mLs by mouth 4 times daily as needed for Congestion or Cough        The patient is to call for any concerns or return if any of the signs or symptoms worsen. The patient is to follow-up with PCP in the next 2-3 days for repeat evaluation repeat assessment or go directly to the emergency department.

## 2021-12-28 ENCOUNTER — OFFICE VISIT (OUTPATIENT)
Dept: FAMILY MEDICINE CLINIC | Age: 26
End: 2021-12-28
Payer: COMMERCIAL

## 2021-12-28 VITALS
DIASTOLIC BLOOD PRESSURE: 60 MMHG | WEIGHT: 207 LBS | HEART RATE: 86 BPM | TEMPERATURE: 97.6 F | OXYGEN SATURATION: 99 % | SYSTOLIC BLOOD PRESSURE: 122 MMHG | BODY MASS INDEX: 31.47 KG/M2

## 2021-12-28 DIAGNOSIS — R05.9 COUGH: Primary | ICD-10-CM

## 2021-12-28 DIAGNOSIS — R05.9 COUGH: ICD-10-CM

## 2021-12-28 DIAGNOSIS — Z20.822 SUSPECTED COVID-19 VIRUS INFECTION: ICD-10-CM

## 2021-12-28 DIAGNOSIS — Z20.822 EXPOSURE TO COVID-19 VIRUS: ICD-10-CM

## 2021-12-28 LAB
Lab: NORMAL
PERFORMING INSTRUMENT: NORMAL
QC PASS/FAIL: NORMAL
SARS-COV-2, POC: NORMAL

## 2021-12-28 PROCEDURE — 87426 SARSCOV CORONAVIRUS AG IA: CPT | Performed by: PHYSICIAN ASSISTANT

## 2021-12-28 PROCEDURE — 99213 OFFICE O/P EST LOW 20 MIN: CPT | Performed by: PHYSICIAN ASSISTANT

## 2021-12-28 NOTE — PROGRESS NOTES
21  Karlene Maciel : 1995 Sex: male  Age 27 y.o. Subjective:  Chief Complaint   Patient presents with    Cough    Concern For COVID-19         HPI:   Karlene Maciel , 27 y.o. male presents to [facility] care for evaluation of cough, congestion, drainage    HPI  73-year-old male presents to [facility] for evaluation cough, congestion, drainage. The patient is noted to have some cough and congestion that have been lingering over the last couple of weeks but his wife just tested positive for Covid. The patient is not vaccinated. He is here for testing. ROS:   Unless otherwise stated in this report the patient's positive and negative responses for review of systems for constitutional, eyes, ENT, cardiovascular, respiratory, gastrointestinal, neurological, , musculoskeletal, and integument systems and related systems to the presenting problem are either stated in the history of present illness or were not pertinent or were negative for the symptoms and/or complaints related to the presenting medical problem. Positives and pertinent negatives as per HPI. All others reviewed and are negative. PMH:   History reviewed. No pertinent past medical history. History reviewed. No pertinent surgical history. History reviewed. No pertinent family history. Medications:     Current Outpatient Medications:     predniSONE (DELTASONE) 10 MG tablet, 3 tabs once daily for 3 days, 2 tabs once daily for 3 days, 1 tab once daily for 3 days, Disp: 18 tablet, Rfl: 0    brompheniramine-pseudoephedrine-DM 2-30-10 MG/5ML syrup, Take 5 mLs by mouth 4 times daily as needed for Congestion or Cough, Disp: 120 mL, Rfl: 0    Allergies:   No Known Allergies    Social History:     Social History     Tobacco Use    Smoking status: Never Smoker    Smokeless tobacco: Never Used   Substance Use Topics    Alcohol use: Not Currently    Drug use: Never       Patient lives at home.     Physical Exam: Vitals:    12/28/21 1110   BP: 122/60   Pulse: 86   Temp: 97.6 °F (36.4 °C)   SpO2: 99%   Weight: 207 lb (93.9 kg)       Exam:  Physical Exam  Nurse's notes and vital signs reviewed. The patient is not hypoxic. ? General: Alert, no acute distress, patient resting comfortably Patient is not toxic or lethargic. Skin: Warm, intact, no pallor noted. There is no evidence of rash at this time. Head: Normocephalic, atraumatic  Eye: Normal conjunctiva  Ears, Nose, Throat: Right tympanic membrane clear, left tympanic membrane clear. No drainage or discharge noted. No pre- or post-auricular tenderness, erythema, or swelling noted. No rhinorrhea or congestion noted. Posterior oropharynx shows no erythema, tonsillar hypertrophy, or exudate. the uvula is midline. No trismus or drooling is noted. Moist mucous membranes. Cardiovascular: Regular Rate and Rhythm  Respiratory: No acute distress, no rhonchi, wheezing or crackles noted. No stridor or retractions are noted. Neurological: A&O x4, normal speech  Psychiatric: Cooperative         Testing:     Results for orders placed or performed in visit on 12/28/21   POCT COVID-19, Antigen   Result Value Ref Range    SARS-COV-2, POC Not-Detected Not Detected    Lot Number [id number]     QC Pass/Fail pass     Performing Instrument BD Veritor            Medical Decision Making:     Vital signs reviewed    Past medical history reviewed. Allergies reviewed. Medications reviewed. Patient on arrival does not appear to be in any apparent distress or discomfort. The patient has been seen and evaluated. The patient does not appear to be toxic or lethargic. Rapid Covid was negative. PCR test pending. The patient does need to quarantine isolate till we can get the results. The patient was educated on the proper dosage of motrin and tylenol and the appropriate intervals of each. The patient is to increase fluid intake over the next several days.  The patient is to use OTC decongestant as needed. The patient is to return to express care or go directly to the emergency department should any of the signs or symptoms worsen. The patient is to followup with primary care physician in 2-3 days for repeat evaluation. The patient has no other questions or concerns at this time the patient will be discharged home. Clinical Impression:   Kalyn Miles was seen today for cough and concern for covid-19. Diagnoses and all orders for this visit:    Cough  -     POCT COVID-19, Antigen  -     COVID-19 Ambulatory; Future    Suspected COVID-19 virus infection    Exposure to COVID-19 virus        The patient is to call for any concerns or return if any of the signs or symptoms worsen. The patient is to follow-up with PCP in the next 2-3 days for repeat evaluation repeat assessment or go directly to the emergency department.      SIGNATURE: Jose Juan Veronica III, PA-C

## 2021-12-28 NOTE — LETTER
[facility]  [address] [place] [address]  Phone: [phone]  Fax: [phone] [place], [place]        December 28, 2021     Patient: Jessica De Jesus   YOB: 1995   Date of Visit: 12/28/2021       To Whom it May Concern:    Jessica De Jesus was seen in my clinic on 12/28/2021. He was tested for Covid-19 and may return to work pending his test results (2 to 3 days). If you have any questions or concerns, please don't hesitate to call.     Sincerely,         ARIEL Romero III

## 2021-12-30 LAB
SARS-COV-2: NOT DETECTED
SOURCE: NORMAL

## 2022-01-05 ENCOUNTER — OFFICE VISIT (OUTPATIENT)
Dept: FAMILY MEDICINE CLINIC | Age: 27
End: 2022-01-05
Payer: COMMERCIAL

## 2022-01-05 VITALS
RESPIRATION RATE: 18 BRPM | HEART RATE: 110 BPM | SYSTOLIC BLOOD PRESSURE: 120 MMHG | WEIGHT: 207 LBS | OXYGEN SATURATION: 97 % | TEMPERATURE: 97.6 F | HEIGHT: 68 IN | BODY MASS INDEX: 31.37 KG/M2 | DIASTOLIC BLOOD PRESSURE: 86 MMHG

## 2022-01-05 DIAGNOSIS — Z20.822 SUSPECTED COVID-19 VIRUS INFECTION: Primary | ICD-10-CM

## 2022-01-05 LAB
Lab: NORMAL
QC PASS/FAIL: NORMAL
SARS-COV-2 RDRP RESP QL NAA+PROBE: NEGATIVE

## 2022-01-05 PROCEDURE — 87635 SARS-COV-2 COVID-19 AMP PRB: CPT | Performed by: FAMILY MEDICINE

## 2022-01-05 PROCEDURE — 99213 OFFICE O/P EST LOW 20 MIN: CPT | Performed by: FAMILY MEDICINE

## 2022-01-05 ASSESSMENT — ENCOUNTER SYMPTOMS
WHEEZING: 0
SINUS PRESSURE: 0
ALLERGIC/IMMUNOLOGIC NEGATIVE: 1
BLOOD IN STOOL: 0
NAUSEA: 0
COLOR CHANGE: 0
SHORTNESS OF BREATH: 0
FACIAL SWELLING: 0
CHEST TIGHTNESS: 0
BACK PAIN: 0
PHOTOPHOBIA: 0
DIARRHEA: 0
ABDOMINAL PAIN: 0
VOMITING: 0
SORE THROAT: 0
COUGH: 0
APNEA: 0

## 2022-01-05 NOTE — PROGRESS NOTES
Chief Complaint:   Chief Complaint   Patient presents with    Concern For COVID-19       HPIexposed to covid - girlfriend  asymptomatic    Patient Active Problem List   Diagnosis    Excessive daytime sleepiness    Chronic fatigue    Palpitations    Adjustment disorder with mixed emotional features       No past medical history on file. No past surgical history on file. No current outpatient medications on file. No current facility-administered medications for this visit. No Known Allergies    Social History     Socioeconomic History    Marital status:      Spouse name: None    Number of children: None    Years of education: None    Highest education level: None   Occupational History    None   Tobacco Use    Smoking status: Never Smoker    Smokeless tobacco: Never Used   Substance and Sexual Activity    Alcohol use: Not Currently    Drug use: Never    Sexual activity: None   Other Topics Concern    None   Social History Narrative    None     Social Determinants of Health     Financial Resource Strain:     Difficulty of Paying Living Expenses: Not on file   Food Insecurity:     Worried About Running Out of Food in the Last Year: Not on file    Lianne of Food in the Last Year: Not on file   Transportation Needs:     Lack of Transportation (Medical): Not on file    Lack of Transportation (Non-Medical):  Not on file   Physical Activity:     Days of Exercise per Week: Not on file    Minutes of Exercise per Session: Not on file   Stress:     Feeling of Stress : Not on file   Social Connections:     Frequency of Communication with Friends and Family: Not on file    Frequency of Social Gatherings with Friends and Family: Not on file    Attends Protestant Services: Not on file    Active Member of Clubs or Organizations: Not on file    Attends Club or Organization Meetings: Not on file    Marital Status: Not on file   Intimate Partner Violence:     Fear of Current or Ex-Partner: Not on file    Emotionally Abused: Not on file    Physically Abused: Not on file    Sexually Abused: Not on file   Housing Stability:     Unable to Pay for Housing in the Last Year: Not on file    Number of Places Lived in the Last Year: Not on file    Unstable Housing in the Last Year: Not on file       No family history on file. Review of Systems   Constitutional: Negative. HENT: Negative for congestion, facial swelling, hearing loss, nosebleeds, sinus pressure and sore throat. Eyes: Negative for photophobia and visual disturbance. Respiratory: Negative for apnea, cough, chest tightness, shortness of breath and wheezing. Cardiovascular: Negative for chest pain, palpitations and leg swelling. Gastrointestinal: Negative for abdominal pain, blood in stool, diarrhea, nausea and vomiting. Genitourinary: Negative for difficulty urinating, frequency and urgency. Musculoskeletal: Negative for arthralgias, back pain, joint swelling and myalgias. Skin: Negative for color change and rash. Allergic/Immunologic: Negative. Neurological: Negative for syncope, weakness, light-headedness and headaches. Hematological: Negative. Psychiatric/Behavioral: Negative for agitation, behavioral problems, confusion and self-injury. The patient is not nervous/anxious. All other systems reviewed and are negative. Physical Exam  Vitals and nursing note reviewed. Constitutional:       General: He is not in acute distress. Appearance: He is well-developed. HENT:      Head: Normocephalic and atraumatic. Nose: Nose normal.   Eyes:      Conjunctiva/sclera: Conjunctivae normal.      Pupils: Pupils are equal, round, and reactive to light. Neck:      Thyroid: No thyromegaly. Vascular: No JVD. Cardiovascular:      Rate and Rhythm: Normal rate and regular rhythm. Heart sounds: Normal heart sounds. No murmur heard. No friction rub. No gallop.     Pulmonary:      Effort:

## 2022-05-16 ENCOUNTER — OFFICE VISIT (OUTPATIENT)
Dept: FAMILY MEDICINE CLINIC | Age: 27
End: 2022-05-16
Payer: COMMERCIAL

## 2022-05-16 VITALS
TEMPERATURE: 97.7 F | HEIGHT: 68 IN | RESPIRATION RATE: 18 BRPM | HEART RATE: 104 BPM | SYSTOLIC BLOOD PRESSURE: 114 MMHG | OXYGEN SATURATION: 98 % | WEIGHT: 189 LBS | BODY MASS INDEX: 28.64 KG/M2 | DIASTOLIC BLOOD PRESSURE: 70 MMHG

## 2022-05-16 DIAGNOSIS — U07.1 COVID-19: Primary | ICD-10-CM

## 2022-05-16 LAB
Lab: ABNORMAL
QC PASS/FAIL: ABNORMAL
SARS-COV-2 RDRP RESP QL NAA+PROBE: POSITIVE

## 2022-05-16 PROCEDURE — 99213 OFFICE O/P EST LOW 20 MIN: CPT

## 2022-05-16 PROCEDURE — 87635 SARS-COV-2 COVID-19 AMP PRB: CPT

## 2022-05-16 RX ORDER — BROMPHENIRAMINE MALEATE, PSEUDOEPHEDRINE HYDROCHLORIDE, AND DEXTROMETHORPHAN HYDROBROMIDE 2; 30; 10 MG/5ML; MG/5ML; MG/5ML
5 SYRUP ORAL 4 TIMES DAILY PRN
Qty: 120 ML | Refills: 0 | Status: SHIPPED | OUTPATIENT
Start: 2022-05-16

## 2022-05-16 NOTE — LETTER
[facility]  [address] [place] [address]  Phone: [phone]  Fax: [phone]    Keena Garcia, [address]        May 16, 2022     Patient: Joice Baumgarten   YOB: 1995   Date of Visit: 5/16/2022       To Whom It May Concern: It is my medical opinion that Joice Baumgarten may return to work 5 days from onset of his symptoms (5/14) and fever free for 24 hours. Tested positive in office for COVID-19 today. If you have any questions or concerns, please don't hesitate to call.     Sincerely,        ARIEL Johnson

## 2022-05-16 NOTE — PROGRESS NOTES
Chief Complaint       Generalized Body Aches, Cough, and Fever    History of Present Illness   Source of history provided by:  patient. Kirill Ortiz is a 27 y.o. old male presenting to the walk in clinic for evaluation of cough, subjective fever, and body aches x 2 days. Took at home COVID-19 test which was positive yesterday. Denies any loss of taste or smell, CP, dyspnea, LE edema, abdominal pain, vomiting, rash, or lethargy. Denies any hx of asthma or COPD. Patient denies recent sick exposures. Patient symptoms are not severe as per patient, work requires confirmation of positive test.     ROS    Unless otherwise stated in this report or unable to obtain because of the patient's clinical or mental status as evidenced by the medical record, this patients's positive and negative responses for Review of Systems, constitutional, psych, eyes, ENT, cardiovascular, respiratory, gastrointestinal, neurological, genitourinary, musculoskeletal, integument systems and systems related to the presenting problem are either stated in the preceding or were not pertinent or were negative for the symptoms and/or complaints related to the medical problem. Physical Exam         VS:  /70 (Site: Right Upper Arm, Position: Sitting, Cuff Size: Medium Adult)   Pulse 104   Temp 97.7 °F (36.5 °C) (Temporal)   Resp 18   Ht 5' 8\" (1.727 m)   Wt 189 lb (85.7 kg)   SpO2 98%   BMI 28.74 kg/m²    Oxygen Saturation Interpretation: Normal.    Constitutional:  Alert, development consistent with age. NAD. Head:  NC/NT. Airway patent. Mouth: Posterior pharynx with mild erythema and clear postnasal drip. No tonsillar hypertrophy or exudate. Neck:  Normal ROM. Supple. No anterior cervical adenopathy noted. Lungs: CTAB without wheezes, rales, or rhonchi. CV:  Regular rate and rhythm, normal heart sounds, without pathological murmurs, ectopy, gallops, or rubs. Skin:  Normal turgor.   Warm, dry, without visible rash.  Lymphatic: No lymphangitis or adenopathy noted. Neurological:  Oriented. Motor functions intact. Lab / Imaging Results   (All laboratory and radiology results have been personally reviewed by myself)  Labs:  Results for orders placed or performed in visit on 05/16/22   POCT COVID-19 Rapid, NAAT   Result Value Ref Range    SARS-COV-2, RdRp gene Positive (A) Negative    Lot Number [id number]     QC Pass/Fail Pass        Imaging: All Radiology results interpreted by Radiologist unless otherwise noted. Assessment / Plan     Impression(s):  Amaya Bob was seen today for generalized body aches, cough and fever. Diagnoses and all orders for this visit:    COVID-19  -     POCT COVID-19 Rapid, NAAT  -     brompheniramine-pseudoephedrine-DM (BROMFED DM) 2-30-10 MG/5ML syrup; Take 5 mLs by mouth 4 times daily as needed for Congestion or Cough      Disposition:  Disposition: Discharge to home. Rapid COVID-19 testing is positive in office. Confirmatory PCR swab obtained and pending, will call with results once available. Pt should remain out of work for at least 5 days from the start of symptoms. Pt should also be fever free for 24 hours and symptoms should be improved overall prior to returning. Increase fluids and rest. Symptomatic relief discussed including Tylenol prn pain/fever. Schedule f/u with PCP in 7-10 days if symptoms persist. ED sooner if symptoms worsen or change. ED immediately with high or refractory fever, progressive SOB, dyspnea, CP, calf pain/swelling, shaking chills, vomiting, abdominal pain, lethargy, flank pain, or decreased urinary output. Pt verbalizes understanding and is in agreement with plan of care. All questions answered. ARIEL Miller    **This report was transcribed using voice recognition software. Every effort was made to ensure accuracy; however, inadvertent computerized transcription errors may be present.

## 2023-04-04 ENCOUNTER — HOSPITAL ENCOUNTER (EMERGENCY)
Age: 28
Discharge: HOME | End: 2023-04-04
Payer: COMMERCIAL

## 2023-04-04 VITALS
SYSTOLIC BLOOD PRESSURE: 131 MMHG | BODY MASS INDEX: 28.89 KG/M2 | DIASTOLIC BLOOD PRESSURE: 76 MMHG | WEIGHT: 190 LBS | HEART RATE: 96 BPM | OXYGEN SATURATION: 100 % | RESPIRATION RATE: 14 BRPM | TEMPERATURE: 97.3 F

## 2023-04-04 DIAGNOSIS — R19.7 DIARRHEA, UNSPECIFIED TYPE: Primary | ICD-10-CM

## 2023-04-04 LAB
ALBUMIN SERPL-MCNC: 3.9 G/DL (ref 3.5–5.2)
ALP SERPL-CCNC: 73 U/L (ref 40–129)
ALT SERPL-CCNC: 23 U/L (ref 0–40)
ANION GAP SERPL CALCULATED.3IONS-SCNC: 10 MMOL/L (ref 7–16)
AST SERPL-CCNC: 23 U/L (ref 0–39)
BACTERIA URNS QL MICRO: ABNORMAL /HPF
BASOPHILS # BLD: 0.05 E9/L (ref 0–0.2)
BASOPHILS NFR BLD: 0.6 % (ref 0–2)
BILIRUB SERPL-MCNC: 1.1 MG/DL (ref 0–1.2)
BILIRUB UR QL STRIP: NEGATIVE
BUN SERPL-MCNC: 17 MG/DL (ref 6–20)
CALCIUM SERPL-MCNC: 8.9 MG/DL (ref 8.6–10.2)
CHLORIDE SERPL-SCNC: 102 MMOL/L (ref 98–107)
CLARITY UR: CLEAR
CO2 SERPL-SCNC: 27 MMOL/L (ref 22–29)
COLOR UR: YELLOW
CREAT SERPL-MCNC: 0.9 MG/DL (ref 0.7–1.2)
EOSINOPHIL # BLD: 0.28 E9/L (ref 0.05–0.5)
EOSINOPHIL NFR BLD: 3.4 % (ref 0–6)
ERYTHROCYTE [DISTWIDTH] IN BLOOD BY AUTOMATED COUNT: 13.1 FL (ref 11.5–15)
GLUCOSE SERPL-MCNC: 96 MG/DL (ref 74–99)
GLUCOSE UR STRIP-MCNC: NEGATIVE MG/DL
HCT VFR BLD AUTO: 43.4 % (ref 37–54)
HGB BLD-MCNC: 13.9 G/DL (ref 12.5–16.5)
HGB UR QL STRIP: NEGATIVE
IMM GRANULOCYTES # BLD: 0.03 E9/L
IMM GRANULOCYTES NFR BLD: 0.4 % (ref 0–5)
INFLUENZA A BY PCR: NOT DETECTED
INFLUENZA B BY PCR: NOT DETECTED
KETONES UR STRIP-MCNC: 15 MG/DL
LACTATE BLDV-SCNC: 1 MMOL/L (ref 0.5–2.2)
LEUKOCYTE ESTERASE UR QL STRIP: NEGATIVE
LIPASE: 29 U/L (ref 13–60)
LYMPHOCYTES # BLD: 3.09 E9/L (ref 1.5–4)
LYMPHOCYTES NFR BLD: 37.4 % (ref 20–42)
MCH RBC QN AUTO: 26 PG (ref 26–35)
MCHC RBC AUTO-ENTMCNC: 32 % (ref 32–34.5)
MCV RBC AUTO: 81.1 FL (ref 80–99.9)
MONOCYTES # BLD: 0.6 E9/L (ref 0.1–0.95)
MONOCYTES NFR BLD: 7.3 % (ref 2–12)
NEUTROPHILS # BLD: 4.21 E9/L (ref 1.8–7.3)
NEUTS SEG NFR BLD: 50.9 % (ref 43–80)
NITRITE UR QL STRIP: NEGATIVE
PH UR STRIP: 6 [PH] (ref 5–9)
PLATELET # BLD AUTO: 259 E9/L (ref 130–450)
PMV BLD AUTO: 11.3 FL (ref 7–12)
POTASSIUM SERPL-SCNC: 4.4 MMOL/L (ref 3.5–5)
PROT SERPL-MCNC: 6.2 G/DL (ref 6.4–8.3)
PROT UR STRIP-MCNC: NEGATIVE MG/DL
RBC # BLD AUTO: 5.35 E12/L (ref 3.8–5.8)
RBC #/AREA URNS HPF: ABNORMAL /HPF (ref 0–2)
SARS-COV-2 RDRP RESP QL NAA+PROBE: NOT DETECTED
SODIUM SERPL-SCNC: 139 MMOL/L (ref 132–146)
SP GR UR STRIP: 1.01 (ref 1–1.03)
UROBILINOGEN UR STRIP-ACNC: 0.2 E.U./DL
WBC # BLD: 8.3 E9/L (ref 4.5–11.5)
WBC #/AREA URNS HPF: ABNORMAL /HPF (ref 0–5)

## 2023-04-04 PROCEDURE — 87502 INFLUENZA DNA AMP PROBE: CPT

## 2023-04-04 PROCEDURE — 83690 ASSAY OF LIPASE: CPT

## 2023-04-04 PROCEDURE — 2580000003 HC RX 258

## 2023-04-04 PROCEDURE — 80053 COMPREHEN METABOLIC PANEL: CPT

## 2023-04-04 PROCEDURE — 81001 URINALYSIS AUTO W/SCOPE: CPT

## 2023-04-04 PROCEDURE — 99284 EMERGENCY DEPT VISIT MOD MDM: CPT

## 2023-04-04 PROCEDURE — 87635 SARS-COV-2 COVID-19 AMP PRB: CPT

## 2023-04-04 PROCEDURE — 85025 COMPLETE CBC W/AUTO DIFF WBC: CPT

## 2023-04-04 PROCEDURE — 83605 ASSAY OF LACTIC ACID: CPT

## 2023-04-04 RX ORDER — ACETAMINOPHEN 500 MG
500 TABLET ORAL 4 TIMES DAILY PRN
Qty: 120 TABLET | Refills: 0 | Status: SHIPPED | OUTPATIENT
Start: 2023-04-04

## 2023-04-04 RX ORDER — 0.9 % SODIUM CHLORIDE 0.9 %
1000 INTRAVENOUS SOLUTION INTRAVENOUS ONCE
Status: COMPLETED | OUTPATIENT
Start: 2023-04-04 | End: 2023-04-04

## 2023-04-04 RX ADMIN — SODIUM CHLORIDE 1000 ML: 9 INJECTION, SOLUTION INTRAVENOUS at 19:38

## 2023-04-04 ASSESSMENT — PAIN DESCRIPTION - LOCATION: LOCATION: BACK

## 2023-04-04 ASSESSMENT — PAIN SCALES - GENERAL: PAINLEVEL_OUTOF10: 5

## 2023-04-04 ASSESSMENT — PAIN DESCRIPTION - ORIENTATION: ORIENTATION: LOWER

## 2023-04-04 ASSESSMENT — PAIN DESCRIPTION - DESCRIPTORS: DESCRIPTORS: ACHING

## 2023-04-04 NOTE — ED NOTES
Department of Emergency Medicine  FIRST PROVIDER TRIAGE NOTE             Independent MLP           4/4/23  5:19 PM EDT    Date of Encounter: 4/4/23   MRN: [id number]      HPI: Milagros Edmonds is a 32 y.o. male who presents to the ED for Diarrhea (Since last Wednesday. Pt states he has low back pain during this time. Pt states he feels bloated. +HA)     Patient states he had diarrhea since last Wednesday patient states he thought it was food poisoning since he had Dahl's that Wednesday and was just fine no prior. Patient states that he has body aches, fevers, headaches with pressure. ROS: Negative for cp, sob, vomiting, urinary complaints, or rash. PE: Gen Appearance/Constitutional: alert  HEENT: NC/NT. PERRLA,  Airway patent. Neck: supple     Initial Plan of Care: All treatment areas with department are currently occupied. Plan to order/Initiate the following while awaiting opening in ED: labs.   Initiate Treatment-Testing, Proceed toTreatment Area When Bed Available for ED Attending/MLP to Continue Care    Electronically signed by Cassia River PA-C   DD: 4/4/23       Cassia River PA-C  04/04/23 [id number]

## 2023-04-04 NOTE — ED PROVIDER NOTES
Independent ROCKY Visit. [facility]  Department of Emergency Medicine   ED  Encounter Note  Admit Date/RoomTime: 2023  7:11 PM  ED Room:     NAME: Abelardo Lizama  : 1995  MRN: [id number]     Chief Complaint:  Diarrhea (Since last Wednesday. Pt states he has low back pain during this time. Pt states he feels bloated. +HA)    History of Present Illness       Abelardo Lizama is a 32 y.o. old male presenting to the emergency department by private vehicle, for intermittent episodes diarrhea which began 6 day(s) prior to arrival.  Since onset the symptoms have been intermittent. Stool quality is described as semisolid. There has been no similar episodes in the past .  He states: no travel, recent antibiotics, tainted food, contact with contagious person, history of GI disease, new medications or change in diet. The symptoms are associated with back pain when he has a bowel movement and denies any chest pain, shortness of breath, fever, chills, nausea, vomiting, dark/black stools, blood in stool, dysuria, or hematuria. The symptoms are aggravated by eating and liquids and relieved by nothing. Patient states that about 60 Dahl's, and since then he has been experiencing diarrhea. He states time he feels bloated, but denies abdominal pain. He states that but he has had diarrhea he does get some cramping as well as lower back pain. He denies low back pain at this time. He states his only when he has a bowel movement. He denies any loss of bowel or bladder. Denies fevers or chills denies chest pain, shortness of breath or hemoptysis. Patient also states that he has had some pressure in his head, and some nasal congestion. Denies any dizziness, vision changes, or neck pain. Patient appears well, nontoxic and in no acute distress. No other complaints concerns at this time.     ROS   Pertinent positives and negatives are stated within HPI, all other systems

## 2023-04-05 NOTE — ED NOTES
Discharge instructions given. Patient verbalizes understanding. No other noted or stated problems at this time. Patient will follow up with primary care.       Lyn Márquez RN  04/04/23 [id number]

## 2024-10-23 ENCOUNTER — OFFICE VISIT (OUTPATIENT)
Dept: FAMILY MEDICINE CLINIC | Age: 29
End: 2024-10-23
Payer: COMMERCIAL

## 2024-10-23 VITALS
OXYGEN SATURATION: 98 % | BODY MASS INDEX: 24.86 KG/M2 | HEART RATE: 90 BPM | SYSTOLIC BLOOD PRESSURE: 118 MMHG | HEIGHT: 68 IN | TEMPERATURE: 97.9 F | DIASTOLIC BLOOD PRESSURE: 76 MMHG | WEIGHT: 164 LBS

## 2024-10-23 DIAGNOSIS — M79.89 LEG SWELLING: ICD-10-CM

## 2024-10-23 DIAGNOSIS — R35.0 URINARY FREQUENCY: Primary | ICD-10-CM

## 2024-10-23 DIAGNOSIS — M54.41 RIGHT-SIDED LOW BACK PAIN WITH RIGHT-SIDED SCIATICA, UNSPECIFIED CHRONICITY: ICD-10-CM

## 2024-10-23 LAB
ALBUMIN: 3.9 G/DL (ref 3.5–5.2)
ALP BLD-CCNC: 52 U/L (ref 40–129)
ALT SERPL-CCNC: 12 U/L (ref 0–40)
ANION GAP SERPL CALCULATED.3IONS-SCNC: 9 MMOL/L (ref 7–16)
AST SERPL-CCNC: 18 U/L (ref 0–39)
BASOPHILS ABSOLUTE: 0.05 K/UL (ref 0–0.2)
BASOPHILS RELATIVE PERCENT: 1 % (ref 0–2)
BILIRUB SERPL-MCNC: 1.4 MG/DL (ref 0–1.2)
BILIRUBIN, POC: NEGATIVE
BLOOD URINE, POC: NEGATIVE
BUN BLDV-MCNC: 8 MG/DL (ref 6–20)
CALCIUM SERPL-MCNC: 8.7 MG/DL (ref 8.6–10.2)
CHLORIDE BLD-SCNC: 103 MMOL/L (ref 98–107)
CLARITY, POC: CLEAR
CO2: 26 MMOL/L (ref 22–29)
COLOR, POC: YELLOW
CREAT SERPL-MCNC: 0.9 MG/DL (ref 0.7–1.2)
EOSINOPHILS ABSOLUTE: 0.18 K/UL (ref 0.05–0.5)
EOSINOPHILS RELATIVE PERCENT: 2 % (ref 0–6)
GFR, ESTIMATED: >90 ML/MIN/1.73M2
GLUCOSE BLD-MCNC: 62 MG/DL (ref 74–99)
GLUCOSE URINE, POC: NEGATIVE MG/DL
HCT VFR BLD CALC: 47 % (ref 37–54)
HEMOGLOBIN: 15 G/DL (ref 12.5–16.5)
IMMATURE GRANULOCYTES %: 1 % (ref 0–5)
IMMATURE GRANULOCYTES ABSOLUTE: 0.04 K/UL (ref 0–0.58)
KETONES, POC: NEGATIVE MG/DL
LEUKOCYTE EST, POC: NEGATIVE
LYMPHOCYTES ABSOLUTE: 2.26 K/UL (ref 1.5–4)
LYMPHOCYTES RELATIVE PERCENT: 30 % (ref 20–42)
MCH RBC QN AUTO: 26 PG (ref 26–35)
MCHC RBC AUTO-ENTMCNC: 31.9 G/DL (ref 32–34.5)
MCV RBC AUTO: 81.6 FL (ref 80–99.9)
MONOCYTES ABSOLUTE: 0.63 K/UL (ref 0.1–0.95)
MONOCYTES RELATIVE PERCENT: 8 % (ref 2–12)
NEUTROPHILS ABSOLUTE: 4.32 K/UL (ref 1.8–7.3)
NEUTROPHILS RELATIVE PERCENT: 58 % (ref 43–80)
NITRITE, POC: NEGATIVE
NT PRO BNP: <36 PG/ML (ref 0–125)
PDW BLD-RTO: 13.4 % (ref 11.5–15)
PH, POC: 6
PLATELET # BLD: 237 K/UL (ref 130–450)
PMV BLD AUTO: 11.9 FL (ref 7–12)
POTASSIUM SERPL-SCNC: 4.2 MMOL/L (ref 3.5–5)
PROTEIN, POC: NEGATIVE MG/DL
RBC # BLD: 5.76 M/UL (ref 3.8–5.8)
SODIUM BLD-SCNC: 138 MMOL/L (ref 132–146)
SPECIFIC GRAVITY, POC: 1.01
TOTAL PROTEIN: 5.7 G/DL (ref 6.4–8.3)
UROBILINOGEN, POC: 0.2 MG/DL
WBC # BLD: 7.5 K/UL (ref 4.5–11.5)

## 2024-10-23 PROCEDURE — 99215 OFFICE O/P EST HI 40 MIN: CPT | Performed by: PHYSICIAN ASSISTANT

## 2024-10-23 PROCEDURE — 81002 URINALYSIS NONAUTO W/O SCOPE: CPT | Performed by: PHYSICIAN ASSISTANT

## 2024-10-23 NOTE — PROGRESS NOTES
Results for orders placed or performed in visit on 10/23/24   POCT Urinalysis no Micro   Result Value Ref Range    Color, UA yellow     Clarity, UA clear     Glucose, UA POC negative mg/dL    Bilirubin, UA negative     Ketones, UA negative mg/dL    Spec Grav, UA 1.010     Blood, UA POC negative     pH, UA 6.0     Protein, UA POC negative mg/dL    Urobilinogen, UA 0.2 mg/dL    Leukocytes, UA negative     Nitrite, UA negative            Medical Decision Making:     Vital signs reviewed    Past medical history reviewed.    Allergies reviewed.    Medications reviewed.    Patient on arrival does not appear to be in any apparent distress or discomfort.  The patient has been seen and evaluated.  The patient does not appear to be toxic or lethargic.     Urinalysis was unremarkable    X-ray of the lumbar spine is pending    Ultrasound of the right lower extremity will be obtained tomorrow.    The patient additionally will have laboratory studies obtained.    Will hold on further medications at this time until we can see his laboratory studies.  The patient was referred to chiropractor for the back pain as well.    The patient is to return to express care or go directly to the emergency department should any of the signs or symptoms worsen. The patient is to followup with primary care physician in 2-3 days for repeat evaluation. The patient has no other questions or concerns at this time the patient will be discharged home.      Clinical Impression:   Nigel was seen today for back pain, leg swelling and foot swelling.    Diagnoses and all orders for this visit:    Urinary frequency  -     POCT Urinalysis no Micro    Leg swelling  -     CBC with Auto Differential; Future  -     Comprehensive Metabolic Panel; Future  -     Vascular duplex lower extremity venous right; Future  -     Brain Natriuretic Peptide; Future    Right-sided low back pain with right-sided sciatica, unspecified chronicity  -     Fabio Thakkar,

## 2024-10-24 RX ORDER — METHYLPREDNISOLONE 4 MG/1
TABLET ORAL
Qty: 1 KIT | Refills: 0 | Status: SHIPPED | OUTPATIENT
Start: 2024-10-24

## 2024-10-24 NOTE — RESULT ENCOUNTER NOTE
Laboratory studies showed a glucose that was slightly low at 62.  Please make sure the patient is consuming plenty of food, drink.    His total bilirubin was elevated at 1.4.  But remainder of liver enzymes are normal.    Total protein was 5.7.    Normal white blood cell count.  Hemoglobin hematocrit were normal.  Platelet count was normal.    proBNP was normal

## 2024-11-06 NOTE — PROGRESS NOTES
[facility]    24  Nigel Clark Guzman : 1995 Sex: male  Age: 29 y.o.    Patient was referred by No primary care provider on file.    Chief Complaint   Patient presents with    Lower Back Pain       Ongoing for months, no injury  Noticed swelling in R leg, pain, went to express care late Oct  Xrays, US -- no dvt  Steroid taper - helped  R knee - pain with squatting    Back Pain  This is a chronic problem. The current episode started more than 1 month ago. The problem occurs constantly. The pain is present in the lumbar spine. The pain radiates to the right foot and right knee. The pain is mild. The symptoms are aggravated by standing and bending (brushing teeth - lean forward). Associated symptoms include leg pain. Pertinent negatives include no bladder incontinence, bowel incontinence or fever. Treatments tried: steroid only.         Red Flags:  none    Review of Systems   Constitutional:  Negative for chills and fever.   Gastrointestinal:  Negative for bowel incontinence.   Genitourinary:  Negative for bladder incontinence.   Musculoskeletal:  Positive for back pain.       No current outpatient medications on file.    No Known Allergies    No past medical history on file.  No family history on file.  No past surgical history on file.  Social History     Socioeconomic History    Marital status:      Spouse name: Not on file    Number of children: Not on file    Years of education: Not on file    Highest education level: Not on file   Occupational History    Not on file   Tobacco Use    Smoking status: Never    Smokeless tobacco: Never   Substance and Sexual Activity    Alcohol use: Not Currently    Drug use: Never    Sexual activity: Not on file   Other Topics Concern    Not on file   Social History Narrative    Not on file     Social Determinants of Health     Financial Resource Strain: Not on file   Food Insecurity: Not on file   Transportation Needs: Not on file   Physical Activity:

## 2024-11-07 ENCOUNTER — OFFICE VISIT (OUTPATIENT)
Dept: CHIROPRACTIC MEDICINE | Age: 29
End: 2024-11-07

## 2024-11-07 VITALS
WEIGHT: 179.23 LBS | HEIGHT: 68 IN | HEART RATE: 77 BPM | DIASTOLIC BLOOD PRESSURE: 78 MMHG | SYSTOLIC BLOOD PRESSURE: 110 MMHG | OXYGEN SATURATION: 99 % | TEMPERATURE: 97.7 F | BODY MASS INDEX: 27.16 KG/M2

## 2024-11-07 DIAGNOSIS — M54.41 ACUTE MIDLINE LOW BACK PAIN WITH RIGHT-SIDED SCIATICA: Primary | ICD-10-CM

## 2024-11-07 ASSESSMENT — ENCOUNTER SYMPTOMS
BACK PAIN: 1
BOWEL INCONTINENCE: 0

## 2024-11-07 NOTE — PROGRESS NOTES
Patient is here for lower back pain into right side. Patient states no injury. Onset of pain has been about 3 months. No primary care provider on file.  Electronically signed by Nancy Fraser LPN on 11/7/2024 at 8:40 AM

## 2024-11-12 ENCOUNTER — OFFICE VISIT (OUTPATIENT)
Dept: CHIROPRACTIC MEDICINE | Age: 29
End: 2024-11-12
Payer: COMMERCIAL

## 2024-11-12 VITALS
DIASTOLIC BLOOD PRESSURE: 72 MMHG | SYSTOLIC BLOOD PRESSURE: 112 MMHG | HEART RATE: 75 BPM | BODY MASS INDEX: 27.13 KG/M2 | TEMPERATURE: 97.7 F | HEIGHT: 68 IN | OXYGEN SATURATION: 98 % | WEIGHT: 179 LBS

## 2024-11-12 DIAGNOSIS — M54.41 ACUTE MIDLINE LOW BACK PAIN WITH RIGHT-SIDED SCIATICA: Primary | ICD-10-CM

## 2024-11-12 PROCEDURE — 97014 ELECTRIC STIMULATION THERAPY: CPT | Performed by: CHIROPRACTOR

## 2024-11-12 PROCEDURE — 98940 CHIROPRACT MANJ 1-2 REGIONS: CPT | Performed by: CHIROPRACTOR

## 2024-11-12 NOTE — PROGRESS NOTES
Patient is here for follow up lower back. Patient states improvement since last visit. No primary care provider on file.  Electronically signed by Nancy Fraser LPN on 11/12/2024 at 10:36 AM

## 2024-11-12 NOTE — PROGRESS NOTES
24  Nigel Guzman : 1995 Sex: male  Age: 29 y.o.    Chief Complaint   Patient presents with    Lower Back Pain       HPI:   Pain has improved. On average, pain is perceived as moderate (4 pain scale). Patient denies new numbness, new weakness, new tingling.  He has not been doing his HEP but still feels he is improving.  Still some difficulty with brushing his teeth.  Feels his right lower leg is not swollen much anymore.    No current outpatient medications on file.    Exam:   Vitals:    24 1036   BP: 112/72   Pulse: 75   Temp: 97.7 °F (36.5 °C)   SpO2: 98%     SLR right reproduces low back pain only at approximately 45 degrees.  Yuliya's testing to the right reproduces left-sided lower back pain.  There are hypertonic and tender fibers noted with palpation in the paraspinal muscles of the lumbar region. Joint fixation is noted with motion screening at L4-S1, bilateral SI joints.    Nigel was seen today for lower back pain.    Diagnoses and all orders for this visit:    Acute midline low back pain with right-sided sciatica        Treatment Plan:  EMS with heat to the lumbar region for 15 minutes to address muscle spasm/hypertension and alleviate pain.  Diversified manipulation to the above-listed segments in the lumbar spine and SI joints .  Tolerated well.  I will see him back next week for continued care.    He did state he would like to try to get the MRI at some point to know more about what is happening in his back.        Seen By:  Fabio Torres DC

## 2024-11-20 ENCOUNTER — OFFICE VISIT (OUTPATIENT)
Dept: CHIROPRACTIC MEDICINE | Age: 29
End: 2024-11-20

## 2024-11-20 VITALS
DIASTOLIC BLOOD PRESSURE: 78 MMHG | TEMPERATURE: 97.6 F | SYSTOLIC BLOOD PRESSURE: 102 MMHG | HEIGHT: 68 IN | HEART RATE: 79 BPM | BODY MASS INDEX: 27.13 KG/M2 | OXYGEN SATURATION: 98 % | WEIGHT: 179 LBS

## 2024-11-20 DIAGNOSIS — M54.41 ACUTE MIDLINE LOW BACK PAIN WITH RIGHT-SIDED SCIATICA: Primary | ICD-10-CM

## 2024-11-20 NOTE — PROGRESS NOTES
Patient is here for follow up lower back. No primary care provider on file.  Electronically signed by Nancy Fraser LPN on 11/20/2024 at 10:08 AM

## 2024-11-20 NOTE — PROGRESS NOTES
24  Nigel Clark Guzman : 1995 Sex: male  Age: 29 y.o.    Chief Complaint   Patient presents with    Lower Back Pain       HPI:   Pain has improved.  He finally started his HEP.  No new issues.  But he had some anterior hip pain with some of the stretching reportedly.  No right sided leg pain today, but did have some left-sided into the hip with the stretches as noted previously.  Also still has some difficulty and increased pain with leaning forward, brushing his teeth.  Patient denies new numbness, new weakness, new tingling      No current outpatient medications on file.    Exam:   Vitals:    24 1007   BP: 102/78   Pulse: 79   Temp: 97.6 °F (36.4 °C)   SpO2: 98%       There are hypertonic and tender fibers noted with palpation in the paraspinal muscles of the lumbar region. Joint fixation is noted with motion screening at bilateral SI joints, L4-S1.    Nigel was seen today for lower back pain.    Diagnoses and all orders for this visit:    Acute midline low back pain with right-sided sciatica        Treatment Plan:  EMS with heat to the lumbar region for 15 minutes to address muscle spasm/hypertension and alleviate pain.  Diversified manipulation to the above-listed segments in the lumbar spine and SI jts .  Tolerated well.  I want him to continue with his HEP.  He understands I will be out next week due to the holidays.  I will see him back in 2 weeks.  Will reexamine him at that point.  If symptoms persist consider MRI at that point.        Seen By:  Fabio Torres DC

## 2024-12-03 ENCOUNTER — OFFICE VISIT (OUTPATIENT)
Dept: CHIROPRACTIC MEDICINE | Age: 29
End: 2024-12-03
Payer: COMMERCIAL

## 2024-12-03 VITALS
BODY MASS INDEX: 27.13 KG/M2 | HEIGHT: 68 IN | TEMPERATURE: 97.1 F | DIASTOLIC BLOOD PRESSURE: 72 MMHG | WEIGHT: 179 LBS | OXYGEN SATURATION: 98 % | SYSTOLIC BLOOD PRESSURE: 122 MMHG | HEART RATE: 78 BPM

## 2024-12-03 DIAGNOSIS — M54.41 ACUTE MIDLINE LOW BACK PAIN WITH RIGHT-SIDED SCIATICA: Primary | ICD-10-CM

## 2024-12-03 PROCEDURE — 98940 CHIROPRACT MANJ 1-2 REGIONS: CPT | Performed by: CHIROPRACTOR

## 2024-12-03 PROCEDURE — 97014 ELECTRIC STIMULATION THERAPY: CPT | Performed by: CHIROPRACTOR

## 2024-12-03 NOTE — PROGRESS NOTES
12/3/24  Nigel Guzman : 1995 Sex: male  Age: 29 y.o.    Chief Complaint   Patient presents with    Lower Back Pain       HPI:   Pain is unchanged. On average, pain is perceived as moderate (4-6 pain scale). Patient denies new numbness, new weakness, new tingling  Feels things have stagnated at this point.  Pain in back, off to left side.  Not painful into legs but feels leg was swollen yesterday.      No current outpatient medications on file.    Exam:   Vitals:    24 0944   BP: 122/72   Pulse: 78   Temp: 97.1 °F (36.2 °C)   SpO2: 98%     He stands unassisted.  No apparent distress.  No antalgia.  Lumbar AROM:  Flexion 50% with low back pain  Extension 50% with low back pain   R Lat Flex 75%  L Lat Flex 75%    Sensation to light touch over the distal lower extremity dermatomes were WNL.  Posterior tibial pulses are 2/4.  Manual muscle testing reveals 5/5 strength of the lower extremity indicator muscles.  SLR testing reveals hamstring hypertonicity bilaterally, but does not reproduce lower extremity sensation.  Yuliya's testing negative bilaterally.  No sciatic notch tenderness.  There are hypertonic and tender fibers noted with palpation in the paraspinal muscles of the lumbar region. Joint fixation is noted with motion screening at L4-S1, bilateral SI joints.    Nigel was seen today for lower back pain.    Diagnoses and all orders for this visit:    Acute midline low back pain with right-sided sciatica  -     MRI LUMBAR SPINE WO CONTRAST; Future        Treatment Plan:    EMS with heat to the lumbar region for 15 minutes to address muscle spasm/hypertension and alleviate pain.  Diversified manipulation to the above-listed segments in the lumbar, SI joints noted above.  Tolerated well.  At this point given some several options as his back pain has stagnated.  Rather than some physical therapy he would prefer to try to get the MRI at this point.  I will go ahead and request that today.  I did let

## 2024-12-03 NOTE — PROGRESS NOTES
Patient is here for follow up lower back. No primary care provider on file.  Electronically signed by Nancy Fraser LPN on 12/3/2024 at 9:45 AM

## 2024-12-31 ENCOUNTER — HOSPITAL ENCOUNTER (OUTPATIENT)
Dept: MRI IMAGING | Age: 29
Discharge: HOME | End: 2025-01-02
Payer: COMMERCIAL

## 2024-12-31 DIAGNOSIS — M54.41 ACUTE MIDLINE LOW BACK PAIN WITH RIGHT-SIDED SCIATICA: ICD-10-CM

## 2024-12-31 PROCEDURE — 72148 MRI LUMBAR SPINE W/O DYE: CPT

## 2025-01-06 DIAGNOSIS — M51.16 HERNIATION OF INTERVERTEBRAL DISC OF LUMBAR SPINE WITH SCIATICA: Primary | ICD-10-CM

## 2025-01-31 ENCOUNTER — OFFICE VISIT (OUTPATIENT)
Dept: PAIN MANAGEMENT | Age: 30
End: 2025-01-31
Payer: COMMERCIAL

## 2025-01-31 VITALS
HEIGHT: 68 IN | SYSTOLIC BLOOD PRESSURE: 114 MMHG | BODY MASS INDEX: 28.34 KG/M2 | DIASTOLIC BLOOD PRESSURE: 72 MMHG | HEART RATE: 76 BPM | OXYGEN SATURATION: 98 % | TEMPERATURE: 97.8 F | WEIGHT: 187 LBS | RESPIRATION RATE: 16 BRPM

## 2025-01-31 DIAGNOSIS — M54.41 CHRONIC RIGHT-SIDED LOW BACK PAIN WITH RIGHT-SIDED SCIATICA: ICD-10-CM

## 2025-01-31 DIAGNOSIS — G89.29 CHRONIC RIGHT-SIDED LOW BACK PAIN WITH RIGHT-SIDED SCIATICA: ICD-10-CM

## 2025-01-31 DIAGNOSIS — M51.9 LUMBAR DISC DISORDER: ICD-10-CM

## 2025-01-31 DIAGNOSIS — M54.16 LUMBAR RADICULOPATHY: ICD-10-CM

## 2025-01-31 DIAGNOSIS — G89.4 CHRONIC PAIN SYNDROME: Primary | ICD-10-CM

## 2025-01-31 PROCEDURE — 99205 OFFICE O/P NEW HI 60 MIN: CPT | Performed by: PAIN MEDICINE

## 2025-01-31 RX ORDER — PREGABALIN 50 MG/1
50 CAPSULE ORAL 3 TIMES DAILY
Qty: 90 CAPSULE | Refills: 2 | Status: SHIPPED | OUTPATIENT
Start: 2025-01-31 | End: 2025-05-01

## 2025-01-31 NOTE — PROGRESS NOTES
[facility]        [address] [address]  Dept: [phone]        Patient:  PAULINA Kerr 1995    Date of Service:  25     Requesting Physician:  Fabio Torres DC    Reason for Consult:      Patient presents with complaints of right, lower back pain that started >2 months ago    HISTORY OF PRESENT ILLNESS:      Pain is intermittent and is described as stabbing.     Pain does not radiate to both lower extremities but previously radiated to the RLE. He  does not have numbness, tingling, weakness of the both lower extremities and does not have bladder or bowel dysfunction.    Alleviating factors include: chiro.  Aggravating factors include:  lifting, twisting.     He has not been on anticoagulation medications to include ASA, NSAIDS, Plavix, heparin, LMW heparin, and warfarin and has not been on herbal supplements.  He is not diabetic.    Imagin/2024 lumbar MRI w/o -    FINDINGS:  BONES/ALIGNMENT: Normal lumbar vertebral body height and alignment.  No  lumbar spine fracture.  No suspicious marrow signal abnormality.     SPINAL CORD: On the basis of 5 lumbar type vertebral bodies, the conus  medullaris terminates at the T12-L1 level.     SOFT TISSUES: No paraspinal mass identified.     L1-L2: Mild bilateral facet hypertrophy.  No significant central canal or  neural foraminal stenosis.     L2-L3: Mild bilateral facet hypertrophy with no significant central canal or  neural foraminal stenosis.     L3-L4: Mild bilateral hypertrophy of the ligamentum flavum.  Mild bilateral  neural foraminal stenosis.  No central canal stenosis.     L4-L5: Mild bilateral facet hypertrophy.  No significant central canal  stenosis.  Mild bilateral neural foraminal stenosis, right greater than left.     L5-S1: Mild disc space narrowing.  There is mild disc bulge.  There is a disc  extrusion in the right subarticular zone which measures approximately 10 mm  in

## 2025-01-31 NOTE — PROGRESS NOTES
Nigel Guzman presents to the [facility] on 1/31/2025. Nigel is complaining of pain lower back. The pain is intermittent. The pain is described as stabbing. Pain is rated on his best day at a 3, on his worst day at a 7, and on average at a 5 on the VAS scale.    Any procedures since your last visit: No,     Pacemaker or defibrillator: No     He is not on NSAIDS and is not on anticoagulation medications.    Do you want someone present when the provider examines you? No    Medication Contract and Consent for Opioid Use Documents Filed        No documents found                    /72   Pulse 76   Temp 97.8 °F (36.6 °C) (Infrared)   Resp 16   Ht 1.727 m (5' 8\")   Wt 84.8 kg (187 lb)   SpO2 98%   BMI 28.43 kg/m²      No LMP for male patient.